# Patient Record
Sex: MALE | Race: WHITE | Employment: FULL TIME | URBAN - METROPOLITAN AREA
[De-identification: names, ages, dates, MRNs, and addresses within clinical notes are randomized per-mention and may not be internally consistent; named-entity substitution may affect disease eponyms.]

---

## 2020-08-31 ENCOUNTER — HOSPITAL ENCOUNTER (OUTPATIENT)
Age: 50
Discharge: HOME | End: 2020-09-02
Payer: COMMERCIAL

## 2020-08-31 PROCEDURE — 88305 TISSUE EXAM BY PATHOLOGIST: CPT

## 2022-03-25 ENCOUNTER — APPOINTMENT (OUTPATIENT)
Dept: CT IMAGING | Age: 52
End: 2022-03-25
Payer: COMMERCIAL

## 2022-03-25 ENCOUNTER — HOSPITAL ENCOUNTER (EMERGENCY)
Age: 52
Discharge: HOME | End: 2022-03-25
Attending: EMERGENCY MEDICINE
Payer: COMMERCIAL

## 2022-03-25 ENCOUNTER — APPOINTMENT (OUTPATIENT)
Dept: GENERAL RADIOLOGY | Age: 52
End: 2022-03-25
Payer: COMMERCIAL

## 2022-03-25 VITALS
DIASTOLIC BLOOD PRESSURE: 84 MMHG | RESPIRATION RATE: 10 BRPM | TEMPERATURE: 98.4 F | OXYGEN SATURATION: 98 % | HEART RATE: 52 BPM | SYSTOLIC BLOOD PRESSURE: 124 MMHG

## 2022-03-25 DIAGNOSIS — L03.114 CELLULITIS OF LEFT UPPER EXTREMITY: ICD-10-CM

## 2022-03-25 DIAGNOSIS — R55 NEAR SYNCOPE: Primary | ICD-10-CM

## 2022-03-25 LAB
ALBUMIN SERPL-MCNC: 4.5 G/DL (ref 3.5–5.2)
ALP BLD-CCNC: 86 U/L (ref 40–129)
ALT SERPL-CCNC: 26 U/L (ref 0–40)
ANION GAP SERPL CALCULATED.3IONS-SCNC: 13 MMOL/L (ref 7–16)
AST SERPL-CCNC: 24 U/L (ref 0–39)
BASOPHILS ABSOLUTE: 0.02 E9/L (ref 0–0.2)
BASOPHILS RELATIVE PERCENT: 0.3 % (ref 0–2)
BILIRUB SERPL-MCNC: 0.3 MG/DL (ref 0–1.2)
BUN BLDV-MCNC: 14 MG/DL (ref 6–20)
CALCIUM SERPL-MCNC: 9.2 MG/DL (ref 8.6–10.2)
CHLORIDE BLD-SCNC: 101 MMOL/L (ref 98–107)
CO2: 23 MMOL/L (ref 22–29)
CREAT SERPL-MCNC: 0.9 MG/DL (ref 0.7–1.2)
EKG ATRIAL RATE: 53 BPM
EKG P AXIS: 39 DEGREES
EKG P-R INTERVAL: 192 MS
EKG Q-T INTERVAL: 412 MS
EKG QRS DURATION: 92 MS
EKG QTC CALCULATION (BAZETT): 386 MS
EKG R AXIS: 50 DEGREES
EKG T AXIS: 36 DEGREES
EKG VENTRICULAR RATE: 53 BPM
EOSINOPHILS ABSOLUTE: 0.14 E9/L (ref 0.05–0.5)
EOSINOPHILS RELATIVE PERCENT: 2 % (ref 0–6)
GFR AFRICAN AMERICAN: >60
GFR NON-AFRICAN AMERICAN: >60 ML/MIN/1.73
GLUCOSE BLD-MCNC: 82 MG/DL (ref 74–99)
HCT VFR BLD CALC: 47.6 % (ref 37–54)
HEMOGLOBIN: 16.3 G/DL (ref 12.5–16.5)
IMMATURE GRANULOCYTES #: 0.01 E9/L
IMMATURE GRANULOCYTES %: 0.1 % (ref 0–5)
LYMPHOCYTES ABSOLUTE: 1.74 E9/L (ref 1.5–4)
LYMPHOCYTES RELATIVE PERCENT: 24.8 % (ref 20–42)
MCH RBC QN AUTO: 30 PG (ref 26–35)
MCHC RBC AUTO-ENTMCNC: 34.2 % (ref 32–34.5)
MCV RBC AUTO: 87.5 FL (ref 80–99.9)
MONOCYTES ABSOLUTE: 0.92 E9/L (ref 0.1–0.95)
MONOCYTES RELATIVE PERCENT: 13.1 % (ref 2–12)
NEUTROPHILS ABSOLUTE: 4.19 E9/L (ref 1.8–7.3)
NEUTROPHILS RELATIVE PERCENT: 59.7 % (ref 43–80)
PDW BLD-RTO: 13.1 FL (ref 11.5–15)
PLATELET # BLD: 269 E9/L (ref 130–450)
PMV BLD AUTO: 9.1 FL (ref 7–12)
POTASSIUM REFLEX MAGNESIUM: 4.5 MMOL/L (ref 3.5–5)
RBC # BLD: 5.44 E12/L (ref 3.8–5.8)
SODIUM BLD-SCNC: 137 MMOL/L (ref 132–146)
TOTAL PROTEIN: 7.7 G/DL (ref 6.4–8.3)
TROPONIN, HIGH SENSITIVITY: <6 NG/L (ref 0–11)
WBC # BLD: 7 E9/L (ref 4.5–11.5)

## 2022-03-25 PROCEDURE — 99283 EMERGENCY DEPT VISIT LOW MDM: CPT

## 2022-03-25 PROCEDURE — 93005 ELECTROCARDIOGRAM TRACING: CPT | Performed by: EMERGENCY MEDICINE

## 2022-03-25 PROCEDURE — 85025 COMPLETE CBC W/AUTO DIFF WBC: CPT

## 2022-03-25 PROCEDURE — 71045 X-RAY EXAM CHEST 1 VIEW: CPT

## 2022-03-25 PROCEDURE — 93226 XTRNL ECG REC<48 HR SCAN A/R: CPT

## 2022-03-25 PROCEDURE — 84484 ASSAY OF TROPONIN QUANT: CPT

## 2022-03-25 PROCEDURE — 80053 COMPREHEN METABOLIC PANEL: CPT

## 2022-03-25 PROCEDURE — 93225 XTRNL ECG REC<48 HRS REC: CPT

## 2022-03-25 PROCEDURE — 2580000003 HC RX 258: Performed by: EMERGENCY MEDICINE

## 2022-03-25 PROCEDURE — 70450 CT HEAD/BRAIN W/O DYE: CPT

## 2022-03-25 RX ORDER — 0.9 % SODIUM CHLORIDE 0.9 %
1000 INTRAVENOUS SOLUTION INTRAVENOUS ONCE
Status: COMPLETED | OUTPATIENT
Start: 2022-03-25 | End: 2022-03-25

## 2022-03-25 RX ORDER — CLINDAMYCIN HYDROCHLORIDE 300 MG/1
300 CAPSULE ORAL 4 TIMES DAILY
Qty: 28 CAPSULE | Refills: 0 | Status: SHIPPED | OUTPATIENT
Start: 2022-03-25 | End: 2022-04-01

## 2022-03-25 RX ADMIN — SODIUM CHLORIDE 1000 ML: 9 INJECTION, SOLUTION INTRAVENOUS at 10:32

## 2022-03-25 ASSESSMENT — ENCOUNTER SYMPTOMS
NAUSEA: 0
ABDOMINAL PAIN: 0
SHORTNESS OF BREATH: 0
DIARRHEA: 0
BACK PAIN: 0
COUGH: 0
SORE THROAT: 0
EYE PAIN: 0
VOMITING: 0

## 2022-03-25 NOTE — Clinical Note
Santos Mosqueda was seen and treated in our emergency department on 3/25/2022. He may return to work on 03/29/2022. If you have any questions or concerns, please don't hesitate to call.       Zina Nation, DO

## 2022-03-25 NOTE — ED NOTES
Cardiac Services aware pt needs Holter monitor. They will come place.       Dionna Quinn, RN  03/25/22 7244

## 2022-03-25 NOTE — ED PROVIDER NOTES
HPI   Patient is a 47 y.o. male with a past medical history of noncontributory, presenting to the Emergency Department for dizziness. History obtained by patient. Symptoms are mild to moderate in severity and intermittent since onset. They are improved by nothing and worsened by driving. Patient presents with dizziness. States that he has had intermittent dizziness and feeling like he is going to pass out for the last month. States initially started 1 month prior. The events usually only happen when he is driving to work in the morning. He states it has been persistent over the last 2 weeks. He states he was driving to work and get sudden onset of tunnel vision and feels like he is going to pass out. He states he pulls over and does deep breathing exercises it resolves after a few minutes. He then continues on with his day. He states he never really gets it on his drive home. He is never really gets it at work. He states today was a worse episode which is similar to his worst episode 1 month prior. He states he had to pull over to the side of the road. He states his feeling like he was going to pass out lasted longer. A  then pulled over and they proceeded to call EMS. He has had it happen once or twice while standing but states it always happens in the morning in route to work. Denies any stressors with work or any new life stressors. He states he does not usually feel anxious when it occurs. He does intermittently have heart palpitations with it. Denies blurry vision or changes in vision. Denies any numbness, tingling or weakness. He saw his primary care provider 2 days prior but states he forgot to bring it up to her. He has not ever fully passed out and only feels like he is going to. He states he feels somewhat flushed when it happens but does not have any diaphoresis, chest pain, nausea, vomiting.   He denies any room spinning sensation    Review of Systems Constitutional: Negative for chills and fever. HENT: Negative for congestion, ear pain and sore throat. Eyes: Negative for pain and visual disturbance. Respiratory: Negative for cough and shortness of breath. Cardiovascular: Negative for chest pain. Gastrointestinal: Negative for abdominal pain, diarrhea, nausea and vomiting. Genitourinary: Negative for dysuria and frequency. Musculoskeletal: Negative for arthralgias and back pain. Skin: Negative for rash and wound. Neurological: Positive for light-headedness. Negative for seizures, syncope, speech difficulty, weakness, numbness and headaches. All other systems reviewed and are negative. Physical Exam  Vitals and nursing note reviewed. Constitutional:       General: He is not in acute distress. Appearance: Normal appearance. He is well-developed. He is not ill-appearing. HENT:      Head: Normocephalic and atraumatic. Right Ear: Tympanic membrane normal. There is no impacted cerumen. Left Ear: Tympanic membrane normal. There is no impacted cerumen. Eyes:      Extraocular Movements: Extraocular movements intact. Cardiovascular:      Rate and Rhythm: Normal rate and regular rhythm. Pulses: Normal pulses. Heart sounds: Normal heart sounds. No murmur heard. Pulmonary:      Effort: Pulmonary effort is normal. No respiratory distress. Breath sounds: Normal breath sounds. No wheezing or rales. Abdominal:      Palpations: Abdomen is soft. Tenderness: There is no abdominal tenderness. There is no guarding or rebound. Musculoskeletal:         General: Normal range of motion. Cervical back: Normal range of motion and neck supple. Skin:     General: Skin is warm and dry. Capillary Refill: Capillary refill takes less than 2 seconds. Findings: Rash present.       Comments: Mild cellulitis skin changes to the posterior LUE, palpable pulses, compartments are soft and compressible Neurological:      General: No focal deficit present. Mental Status: He is alert and oriented to person, place, and time. Cranial Nerves: No cranial nerve deficit. Sensory: No sensory deficit. Motor: No weakness. Coordination: Coordination normal.      Gait: Gait normal.          Procedures     MDM   Patient presented to the Emergency Department for lightheadedness. They are clinically stable, vital signs stable, non toxic appearing. No chest pain, ekg reassuring. H&P less concerning forACS. Not tachy, not hypoxic, PE considered but less likely. Has never had any episodes of syncope. neurovasc in tact. Orthostatics stable. Symptoms usually at rest. Labs and imagining reassuring. Spoke with PCP and patient with recent cardio eval and work up that was reassuring. Will proceed with holter monitor and instructed for PCP and cardio follow up. Remained neurovasc in tact, with reassuring H&P, labs, imagining, okay for close outpatient eval and care. Symptoms occur when driving in am to work, anxiety component? H&P less concerning for cardiac arrythmia. Does have cellulitis and will treat. Educated patient and wife about symptoms, diagnosis and supportive care. Will follow up with PCP and cardio and wear 48hr holter which was placed in ED. Strict return precautions discussed including but not limited to symptoms with exertion, emesis, chest pain, dyspnea, leg pain or swelling, syncope. They verbalized understanding and were agreeable with the plan. All questions were answered and patient was discharged. ED Course as of 03/25/22 6271   Fri Mar 25, 2022   1117   ATTENDING PROVIDER ATTESTATION:     I have personally performed and/or participated in the history, exam, medical decision making, and procedures and agree with all pertinent clinical information unless otherwise noted. I have also reviewed and agree with the past medical, family and social history unless otherwise noted.     I have discussed this patient in detail with the resident and provided the instruction and education regarding the evidence-based evaluation and treatment of near syncope. History: patient has been experiencing near syncopal episodes most frequently in the AM on his way to work. He states he does not feel anxious. He denies CP or SOB. No pain in the legs or edema. Low risk Well's. PERC negative. My findings: Emani Gill is a 47 y.o. male whom is in no distress. Physical exam reveals well appearing. No pallor. Heart RRR, lungs CTA, abdomen is soft and nontender. No peripheral edema or tenderness. No focal neurologic deficits. My plan: Symptomatic and supportive care. Cardiac evaluation without concerning findings and no ectopy on the monitor. Will discuss with PCP placing the patient on a Holter monitor and prompt outpatient follow up. He was encouraged to avoid driving until seen by Dr Veronica Cerrato. Electronically signed by Gustavo Bone DO on 3/25/22 at 11:17 AM EDT       [JS]   6938 Spoke with Dr Veronica Cerrato. She states he had a \"full cardiac workup at [facility] recently\". It did not include a Holter monitor. She will see him in follow up. [JS]      ED Course User Index  [JS] Gustavo Bone DO          --------------------------------------------- PAST HISTORY ---------------------------------------------  Past Medical History:  has no past medical history on file. Past Surgical History:  has no past surgical history on file. Social History:  reports that he has never smoked. He does not have any smokeless tobacco history on file. He reports previous alcohol use. He reports previous drug use. Family History: family history is not on file. The patients home medications have been reviewed.     Allergies: Pcn [penicillins]    -------------------------------------------------- RESULTS -------------------------------------------------  Labs:  Results for orders placed or performed during the hospital encounter of 03/25/22   CBC with Auto Differential   Result Value Ref Range    WBC 7.0 4.5 - 11.5 E9/L    RBC 5.44 3.80 - 5.80 E12/L    Hemoglobin 16.3 12.5 - 16.5 g/dL    Hematocrit 47.6 37.0 - 54.0 %    MCV 87.5 80.0 - 99.9 fL    MCH 30.0 26.0 - 35.0 pg    MCHC 34.2 32.0 - 34.5 %    RDW 13.1 11.5 - 15.0 fL    Platelets 571 140 - 546 E9/L    MPV 9.1 7.0 - 12.0 fL    Neutrophils % 59.7 43.0 - 80.0 %    Immature Granulocytes % 0.1 0.0 - 5.0 %    Lymphocytes % 24.8 20.0 - 42.0 %    Monocytes % 13.1 (H) 2.0 - 12.0 %    Eosinophils % 2.0 0.0 - 6.0 %    Basophils % 0.3 0.0 - 2.0 %    Neutrophils Absolute 4.19 1.80 - 7.30 E9/L    Immature Granulocytes # 0.01 E9/L    Lymphocytes Absolute 1.74 1.50 - 4.00 E9/L    Monocytes Absolute 0.92 0.10 - 0.95 E9/L    Eosinophils Absolute 0.14 0.05 - 0.50 E9/L    Basophils Absolute 0.02 0.00 - 0.20 E9/L   Comprehensive Metabolic Panel w/ Reflex to MG   Result Value Ref Range    Sodium 137 132 - 146 mmol/L    Potassium reflex Magnesium 4.5 3.5 - 5.0 mmol/L    Chloride 101 98 - 107 mmol/L    CO2 23 22 - 29 mmol/L    Anion Gap 13 7 - 16 mmol/L    Glucose 82 74 - 99 mg/dL    BUN 14 6 - 20 mg/dL    CREATININE 0.9 0.7 - 1.2 mg/dL    GFR Non-African American >60 >=60 mL/min/1.73    GFR African American >60     Calcium 9.2 8.6 - 10.2 mg/dL    Total Protein 7.7 6.4 - 8.3 g/dL    Albumin 4.5 3.5 - 5.2 g/dL    Total Bilirubin 0.3 0.0 - 1.2 mg/dL    Alkaline Phosphatase 86 40 - 129 U/L    ALT 26 0 - 40 U/L    AST 24 0 - 39 U/L   Troponin   Result Value Ref Range    Troponin, High Sensitivity <6 0 - 11 ng/L   EKG 12 Lead   Result Value Ref Range    Ventricular Rate 53 BPM    Atrial Rate 53 BPM    P-R Interval 192 ms    QRS Duration 92 ms    Q-T Interval 412 ms    QTc Calculation (Bazett) 386 ms    P Axis 39 degrees    R Axis 50 degrees    T Axis 36 degrees       Radiology:  CT Head WO Contrast   Final Result   No acute intracranial abnormality.   Specifically, there is no acute   intracranial hemorrhage         XR CHEST PORTABLE   Final Result   No acute process. EKG:  This EKG is signed and interpreted by ED Physician. Time:  1023   Rate: 53  Rhythm: Sinus. Interpretation: sinus bradycardia. Normal axis. No STEMI. QTc 386  Comparison: no previous EKG.      ------------------------- NURSING NOTES AND VITALS REVIEWED ---------------------------  Date / Time Roomed:  3/25/2022  9:24 AM  ED Bed Assignment:  05/05    The nursing notes within the ED encounter and vital signs as below have been reviewed. /84   Pulse 52   Temp 98.4 °F (36.9 °C) (Oral)   Resp 10   SpO2 98%   Oxygen Saturation Interpretation: Normal      ------------------------------------------ PROGRESS NOTES ------------------------------------------  ED COURSE MEDICATIONS:                Medications   0.9 % sodium chloride bolus (0 mLs IntraVENous Stopped 3/25/22 1136)       I have spoken with the patient and discussed todays results, in addition to providing specific details for the plan of care and counseling regarding the diagnosis and prognosis. Their questions are answered at this time and they are agreeable with the plan. I discussed at length with them reasons for immediate return here for re evaluation. They will followup with primary care by calling their office tomorrow. --------------------------------- ADDITIONAL PROVIDER NOTES ---------------------------------  At this time the patient is without objective evidence of an acute process requiring hospitalization or inpatient management. They have remained hemodynamically stable throughout their entire ED visit and are stable for discharge with outpatient follow-up. The plan has been discussed in detail and they are aware of the specific conditions for emergent return, as well as the importance of follow-up. There are no discharge medications for this patient. Diagnosis:  1. Near syncope    2.  Cellulitis of left upper extremity        Disposition:  Patient's disposition: Discharge to home  Patient's condition is stable.         Americo Pate DO  Resident  03/25/22 1260

## 2022-05-25 NOTE — PROGRESS NOTES
History     1. No prior cardiac history  2. No complaints of palpitations  3. Recurrent symptoms of near syncope while sitting or standing. No specific provocative or relieving factors  4. Sinus bradycardia on hospitalization, resolved  5. No alcohol or tobacco abuse  6. Vasovagal syncope versus sinus node dysfunction. 7.The latter cannot be ruled out since some of the episodes occurred while driving his car  8. Positive Tilt test at [facility]  9. Implant of an Abbott [id number] Jot DX  [id number] on 4-4-22. Medication: Bisoprolol 2.5mg daily        ICM Interrogation:    1. Battery:  SHARON, good    2. No Episodes    3. R wave: 0.52 mV        ASSESSMENT:  Appropriate ICM function. Plan:    1. ICM Follow-up in 6 months. 2.  Merlin transmission for alerts and symptoms. 3.  Pt is aware that continuous home monitoring is strongly recommended. 4. Bisoprolol 2.5mg changed to every other day    5. Behaviour mod with increase in salt intake and reducing caffeine intake. Diya Vazquez M.D., F.A.C.C.

## 2022-05-26 ENCOUNTER — NURSE ONLY (OUTPATIENT)
Dept: NON INVASIVE DIAGNOSTICS | Age: 52
End: 2022-05-26
Payer: COMMERCIAL

## 2022-05-26 ENCOUNTER — TELEPHONE (OUTPATIENT)
Dept: NON INVASIVE DIAGNOSTICS | Age: 52
End: 2022-05-26

## 2022-05-26 VITALS — SYSTOLIC BLOOD PRESSURE: 108 MMHG | DIASTOLIC BLOOD PRESSURE: 72 MMHG

## 2022-05-26 DIAGNOSIS — R55 SYNCOPE, UNSPECIFIED SYNCOPE TYPE: ICD-10-CM

## 2022-05-26 DIAGNOSIS — Z98.890 HISTORY OF LOOP RECORDER: ICD-10-CM

## 2022-05-26 PROCEDURE — 93291 INTERROG DEV EVAL SCRMS IP: CPT | Performed by: INTERNAL MEDICINE

## 2023-03-06 ENCOUNTER — HOSPITAL ENCOUNTER (OUTPATIENT)
Dept: MRI IMAGING | Age: 53
Discharge: HOME | End: 2023-03-08
Payer: COMMERCIAL

## 2023-03-06 DIAGNOSIS — M54.50 BILATERAL LOW BACK PAIN, UNSPECIFIED CHRONICITY, UNSPECIFIED WHETHER SCIATICA PRESENT: ICD-10-CM

## 2023-03-06 PROCEDURE — 72148 MRI LUMBAR SPINE W/O DYE: CPT

## 2023-08-24 ENCOUNTER — NURSE ONLY (OUTPATIENT)
Dept: NON INVASIVE DIAGNOSTICS | Age: 53
End: 2023-08-24

## 2023-08-24 DIAGNOSIS — R55 SYNCOPE AND COLLAPSE: ICD-10-CM

## 2023-08-24 DIAGNOSIS — Z98.890 HISTORY OF LOOP RECORDER: Primary | ICD-10-CM

## 2023-08-24 RX ORDER — BISOPROLOL FUMARATE 5 MG/1
TABLET, FILM COATED ORAL
COMMUNITY
Start: 2023-08-16

## 2023-09-26 PROCEDURE — G2066 INTER DEVC REMOTE 30D: HCPCS | Performed by: INTERNAL MEDICINE

## 2023-09-26 PROCEDURE — 93298 REM INTERROG DEV EVAL SCRMS: CPT | Performed by: INTERNAL MEDICINE

## 2023-11-05 PROCEDURE — 93298 REM INTERROG DEV EVAL SCRMS: CPT | Performed by: INTERNAL MEDICINE

## 2023-11-05 PROCEDURE — G2066 INTER DEVC REMOTE 30D: HCPCS | Performed by: INTERNAL MEDICINE

## 2023-12-06 PROCEDURE — G2066 INTER DEVC REMOTE 30D: HCPCS | Performed by: INTERNAL MEDICINE

## 2023-12-06 PROCEDURE — 93298 REM INTERROG DEV EVAL SCRMS: CPT | Performed by: INTERNAL MEDICINE

## 2023-12-20 ENCOUNTER — APPOINTMENT (OUTPATIENT)
Dept: RADIOLOGY | Facility: HOSPITAL | Age: 53
DRG: 243 | End: 2023-12-20
Payer: COMMERCIAL

## 2023-12-20 ENCOUNTER — APPOINTMENT (OUTPATIENT)
Dept: CARDIOLOGY | Facility: HOSPITAL | Age: 53
DRG: 243 | End: 2023-12-20
Payer: COMMERCIAL

## 2023-12-20 ENCOUNTER — HOSPITAL ENCOUNTER (OUTPATIENT)
Facility: HOSPITAL | Age: 53
Setting detail: OBSERVATION
Discharge: HOME | DRG: 243 | End: 2023-12-21
Attending: EMERGENCY MEDICINE | Admitting: INTERNAL MEDICINE
Payer: COMMERCIAL

## 2023-12-20 DIAGNOSIS — R00.1 BRADYCARDIA: ICD-10-CM

## 2023-12-20 DIAGNOSIS — R00.1 SEVERE SINUS BRADYCARDIA: Chronic | ICD-10-CM

## 2023-12-20 DIAGNOSIS — S02.2XXB OPEN FRACTURE OF NASAL BONE, INITIAL ENCOUNTER: ICD-10-CM

## 2023-12-20 DIAGNOSIS — R55 SYNCOPE: ICD-10-CM

## 2023-12-20 DIAGNOSIS — I42.9 CARDIOMYOPATHY (MULTI): ICD-10-CM

## 2023-12-20 DIAGNOSIS — R55 SYNCOPE AND COLLAPSE: Primary | ICD-10-CM

## 2023-12-20 DIAGNOSIS — Z95.0 STATUS POST PLACEMENT OF CARDIAC PACEMAKER: ICD-10-CM

## 2023-12-20 PROBLEM — I10 ESSENTIAL HYPERTENSION: Chronic | Status: ACTIVE | Noted: 2023-12-20

## 2023-12-20 PROBLEM — Z82.49 FAMILY HISTORY OF PACEMAKER: Chronic | Status: ACTIVE | Noted: 2023-12-20

## 2023-12-20 PROBLEM — T14.90XA TRAUMA: Status: ACTIVE | Noted: 2023-12-20

## 2023-12-20 LAB
ABO GROUP (TYPE) IN BLOOD: NORMAL
ABO GROUP (TYPE) IN BLOOD: NORMAL
ALBUMIN SERPL BCP-MCNC: 4 G/DL (ref 3.4–5)
ALP SERPL-CCNC: 63 U/L (ref 33–120)
ALT SERPL W P-5'-P-CCNC: 49 U/L (ref 10–52)
ANION GAP BLDA CALCULATED.4IONS-SCNC: 10 MMO/L (ref 10–25)
ANION GAP SERPL CALC-SCNC: 12 MMOL/L (ref 10–20)
ANTIBODY SCREEN: NORMAL
AORTIC VALVE MEAN GRADIENT: 5
AORTIC VALVE PEAK VELOCITY: 1.51
AST SERPL W P-5'-P-CCNC: 57 U/L (ref 9–39)
AV PEAK GRADIENT: 9.1
AVA (PEAK VEL): 2.71
AVA (VTI): 2.55
BASE EXCESS BLDA CALC-SCNC: 3.1 MMOL/L (ref -2–3)
BASOPHILS # BLD AUTO: 0.01 X10*3/UL (ref 0–0.1)
BASOPHILS NFR BLD AUTO: 0.2 %
BILIRUB SERPL-MCNC: 0.5 MG/DL (ref 0–1.2)
BODY TEMPERATURE: 37 DEGREES CELSIUS
BUN SERPL-MCNC: 16 MG/DL (ref 6–23)
CA-I BLDA-SCNC: 1.1 MMOL/L (ref 1.1–1.33)
CALCIUM SERPL-MCNC: 8.5 MG/DL (ref 8.6–10.3)
CARDIAC TROPONIN I PNL SERPL HS: 5 NG/L (ref 0–20)
CHLORIDE BLDA-SCNC: 100 MMOL/L (ref 98–107)
CHLORIDE SERPL-SCNC: 102 MMOL/L (ref 98–107)
CO2 SERPL-SCNC: 26 MMOL/L (ref 21–32)
CREAT SERPL-MCNC: 1.16 MG/DL (ref 0.5–1.3)
EJECTION FRACTION APICAL 4 CHAMBER: 61.5
EJECTION FRACTION: 66
EOSINOPHIL # BLD AUTO: 0.04 X10*3/UL (ref 0–0.7)
EOSINOPHIL NFR BLD AUTO: 0.7 %
ERYTHROCYTE [DISTWIDTH] IN BLOOD BY AUTOMATED COUNT: 13.3 % (ref 11.5–14.5)
ETHANOL SERPL-MCNC: <10 MG/DL
GFR SERPL CREATININE-BSD FRML MDRD: 75 ML/MIN/1.73M*2
GLUCOSE BLDA-MCNC: 92 MG/DL (ref 74–99)
GLUCOSE SERPL-MCNC: 90 MG/DL (ref 74–99)
HCO3 BLDA-SCNC: 28.5 MMOL/L (ref 22–26)
HCT VFR BLD AUTO: 44.2 % (ref 41–52)
HCT VFR BLD EST: 47 % (ref 41–52)
HGB BLD-MCNC: 15.2 G/DL (ref 13.5–17.5)
HGB BLDA-MCNC: 15.6 G/DL (ref 13.5–17.5)
HOLD SPECIMEN: NORMAL
IMM GRANULOCYTES # BLD AUTO: 0.01 X10*3/UL (ref 0–0.7)
IMM GRANULOCYTES NFR BLD AUTO: 0.2 % (ref 0–0.9)
INHALED O2 CONCENTRATION: 0 %
INR PPP: 1.2 (ref 0.9–1.1)
LACTATE BLDA-SCNC: 1.9 MMOL/L (ref 0.4–2)
LACTATE SERPL-SCNC: 1.4 MMOL/L (ref 0.4–2)
LEFT ATRIUM VOLUME AREA LENGTH INDEX BSA: 20.5
LEFT VENTRICLE INTERNAL DIMENSION DIASTOLE: 4.6 (ref 3.5–6)
LEFT VENTRICULAR OUTFLOW TRACT DIAMETER: 2.1
LIPASE SERPL-CCNC: 13 U/L (ref 9–82)
LYMPHOCYTES # BLD AUTO: 1.42 X10*3/UL (ref 1.2–4.8)
LYMPHOCYTES NFR BLD AUTO: 24.5 %
MCH RBC QN AUTO: 30 PG (ref 26–34)
MCHC RBC AUTO-ENTMCNC: 34.4 G/DL (ref 32–36)
MCV RBC AUTO: 87 FL (ref 80–100)
MITRAL VALVE E/A RATIO: 1.5
MITRAL VALVE E/E' RATIO: 8.01
MONOCYTES # BLD AUTO: 1.06 X10*3/UL (ref 0.1–1)
MONOCYTES NFR BLD AUTO: 18.3 %
NEUTROPHILS # BLD AUTO: 3.25 X10*3/UL (ref 1.2–7.7)
NEUTROPHILS NFR BLD AUTO: 56.1 %
NRBC BLD-RTO: 0 /100 WBCS (ref 0–0)
OXYHGB MFR BLDA: 27.2 % (ref 94–98)
PCO2 BLDA: 45 MM HG (ref 38–42)
PH BLDA: 7.41 PH (ref 7.38–7.42)
PLATELET # BLD AUTO: 179 X10*3/UL (ref 150–450)
PO2 BLDA: 20 MM HG (ref 85–95)
POTASSIUM BLDA-SCNC: 4 MMOL/L (ref 3.5–5.3)
POTASSIUM SERPL-SCNC: 4.3 MMOL/L (ref 3.5–5.3)
PROT SERPL-MCNC: 6.5 G/DL (ref 6.4–8.2)
PROTHROMBIN TIME: 13 SECONDS (ref 9.8–12.8)
RBC # BLD AUTO: 5.07 X10*6/UL (ref 4.5–5.9)
RH FACTOR (ANTIGEN D): NORMAL
RH FACTOR (ANTIGEN D): NORMAL
RIGHT VENTRICLE FREE WALL PEAK S': 12.1
SAO2 % BLDA: 27 % (ref 94–100)
SODIUM BLDA-SCNC: 134 MMOL/L (ref 136–145)
SODIUM SERPL-SCNC: 136 MMOL/L (ref 136–145)
TRICUSPID ANNULAR PLANE SYSTOLIC EXCURSION: 2.9
WBC # BLD AUTO: 5.8 X10*3/UL (ref 4.4–11.3)

## 2023-12-20 PROCEDURE — 2780000003 HC OR 278 NO HCPCS: Performed by: INTERNAL MEDICINE

## 2023-12-20 PROCEDURE — 83690 ASSAY OF LIPASE: CPT | Performed by: EMERGENCY MEDICINE

## 2023-12-20 PROCEDURE — C1785 PMKR, DUAL, RATE-RESP: HCPCS | Performed by: INTERNAL MEDICINE

## 2023-12-20 PROCEDURE — 2500000004 HC RX 250 GENERAL PHARMACY W/ HCPCS (ALT 636 FOR OP/ED)

## 2023-12-20 PROCEDURE — 33208 INSRT HEART PM ATRIAL & VENT: CPT | Performed by: INTERNAL MEDICINE

## 2023-12-20 PROCEDURE — C1898 LEAD, PMKR, OTHER THAN TRANS: HCPCS | Performed by: INTERNAL MEDICINE

## 2023-12-20 PROCEDURE — 96374 THER/PROPH/DIAG INJ IV PUSH: CPT | Mod: 59

## 2023-12-20 PROCEDURE — 83605 ASSAY OF LACTIC ACID: CPT | Performed by: EMERGENCY MEDICINE

## 2023-12-20 PROCEDURE — 2500000004 HC RX 250 GENERAL PHARMACY W/ HCPCS (ALT 636 FOR OP/ED): Performed by: SURGERY

## 2023-12-20 PROCEDURE — 82077 ASSAY SPEC XCP UR&BREATH IA: CPT | Performed by: EMERGENCY MEDICINE

## 2023-12-20 PROCEDURE — 99153 MOD SED SAME PHYS/QHP EA: CPT | Performed by: INTERNAL MEDICINE

## 2023-12-20 PROCEDURE — 99223 1ST HOSP IP/OBS HIGH 75: CPT | Performed by: SURGERY

## 2023-12-20 PROCEDURE — 99152 MOD SED SAME PHYS/QHP 5/>YRS: CPT | Performed by: INTERNAL MEDICINE

## 2023-12-20 PROCEDURE — 76377 3D RENDER W/INTRP POSTPROCES: CPT

## 2023-12-20 PROCEDURE — 99291 CRITICAL CARE FIRST HOUR: CPT | Mod: 25 | Performed by: EMERGENCY MEDICINE

## 2023-12-20 PROCEDURE — 93306 TTE W/DOPPLER COMPLETE: CPT

## 2023-12-20 PROCEDURE — 84484 ASSAY OF TROPONIN QUANT: CPT | Performed by: EMERGENCY MEDICINE

## 2023-12-20 PROCEDURE — G0378 HOSPITAL OBSERVATION PER HR: HCPCS

## 2023-12-20 PROCEDURE — 72170 X-RAY EXAM OF PELVIS: CPT | Mod: FOREIGN READ | Performed by: RADIOLOGY

## 2023-12-20 PROCEDURE — 2060000001 HC INTERMEDIATE ICU ROOM DAILY

## 2023-12-20 PROCEDURE — 76705 ECHO EXAM OF ABDOMEN: CPT | Mod: FOREIGN READ | Performed by: RADIOLOGY

## 2023-12-20 PROCEDURE — 71045 X-RAY EXAM CHEST 1 VIEW: CPT | Mod: FOREIGN READ | Performed by: RADIOLOGY

## 2023-12-20 PROCEDURE — 36415 COLL VENOUS BLD VENIPUNCTURE: CPT | Performed by: EMERGENCY MEDICINE

## 2023-12-20 PROCEDURE — 96372 THER/PROPH/DIAG INJ SC/IM: CPT | Performed by: SURGERY

## 2023-12-20 PROCEDURE — 72170 X-RAY EXAM OF PELVIS: CPT | Mod: FR

## 2023-12-20 PROCEDURE — 2500000005 HC RX 250 GENERAL PHARMACY W/O HCPCS: Performed by: INTERNAL MEDICINE

## 2023-12-20 PROCEDURE — 2720000007 HC OR 272 NO HCPCS: Performed by: INTERNAL MEDICINE

## 2023-12-20 PROCEDURE — 84132 ASSAY OF SERUM POTASSIUM: CPT | Performed by: EMERGENCY MEDICINE

## 2023-12-20 PROCEDURE — 70450 CT HEAD/BRAIN W/O DYE: CPT

## 2023-12-20 PROCEDURE — 71045 X-RAY EXAM CHEST 1 VIEW: CPT | Mod: FR

## 2023-12-20 PROCEDURE — 2750000001 HC OR 275 NO HCPCS: Performed by: INTERNAL MEDICINE

## 2023-12-20 PROCEDURE — 2550000001 HC RX 255 CONTRASTS: Performed by: INTERNAL MEDICINE

## 2023-12-20 PROCEDURE — 96365 THER/PROPH/DIAG IV INF INIT: CPT | Mod: 59

## 2023-12-20 PROCEDURE — 96375 TX/PRO/DX INJ NEW DRUG ADDON: CPT | Mod: 59

## 2023-12-20 PROCEDURE — A4217 STERILE WATER/SALINE, 500 ML: HCPCS | Performed by: NURSE PRACTITIONER

## 2023-12-20 PROCEDURE — 96361 HYDRATE IV INFUSION ADD-ON: CPT | Mod: 59

## 2023-12-20 PROCEDURE — 2500000004 HC RX 250 GENERAL PHARMACY W/ HCPCS (ALT 636 FOR OP/ED): Performed by: NURSE PRACTITIONER

## 2023-12-20 PROCEDURE — 70450 CT HEAD/BRAIN W/O DYE: CPT | Performed by: RADIOLOGY

## 2023-12-20 PROCEDURE — 93306 TTE W/DOPPLER COMPLETE: CPT | Performed by: STUDENT IN AN ORGANIZED HEALTH CARE EDUCATION/TRAINING PROGRAM

## 2023-12-20 PROCEDURE — 99222 1ST HOSP IP/OBS MODERATE 55: CPT | Performed by: STUDENT IN AN ORGANIZED HEALTH CARE EDUCATION/TRAINING PROGRAM

## 2023-12-20 PROCEDURE — 70486 CT MAXILLOFACIAL W/O DYE: CPT

## 2023-12-20 PROCEDURE — 76705 ECHO EXAM OF ABDOMEN: CPT | Mod: FR

## 2023-12-20 PROCEDURE — 2500000004 HC RX 250 GENERAL PHARMACY W/ HCPCS (ALT 636 FOR OP/ED): Performed by: INTERNAL MEDICINE

## 2023-12-20 PROCEDURE — 85025 COMPLETE CBC W/AUTO DIFF WBC: CPT | Performed by: EMERGENCY MEDICINE

## 2023-12-20 PROCEDURE — 86901 BLOOD TYPING SEROLOGIC RH(D): CPT | Performed by: EMERGENCY MEDICINE

## 2023-12-20 PROCEDURE — C1892 INTRO/SHEATH,FIXED,PEEL-AWAY: HCPCS | Performed by: INTERNAL MEDICINE

## 2023-12-20 PROCEDURE — 99285 EMERGENCY DEPT VISIT HI MDM: CPT | Performed by: EMERGENCY MEDICINE

## 2023-12-20 PROCEDURE — 2500000001 HC RX 250 WO HCPCS SELF ADMINISTERED DRUGS (ALT 637 FOR MEDICARE OP): Performed by: SURGERY

## 2023-12-20 PROCEDURE — 85610 PROTHROMBIN TIME: CPT | Performed by: EMERGENCY MEDICINE

## 2023-12-20 PROCEDURE — C1894 INTRO/SHEATH, NON-LASER: HCPCS | Performed by: INTERNAL MEDICINE

## 2023-12-20 PROCEDURE — 99223 1ST HOSP IP/OBS HIGH 75: CPT | Performed by: STUDENT IN AN ORGANIZED HEALTH CARE EDUCATION/TRAINING PROGRAM

## 2023-12-20 PROCEDURE — G0390 TRAUMA RESPONS W/HOSP CRITI: HCPCS

## 2023-12-20 DEVICE — PACING LEAD
Type: IMPLANTABLE DEVICE | Site: CORONARY | Status: FUNCTIONAL
Brand: TENDRIL™

## 2023-12-20 DEVICE — PULSE GENERATOR
Type: IMPLANTABLE DEVICE | Site: CHEST | Status: FUNCTIONAL
Brand: ASSURITY MRI™

## 2023-12-20 RX ORDER — MIDAZOLAM HYDROCHLORIDE 1 MG/ML
INJECTION INTRAMUSCULAR; INTRAVENOUS AS NEEDED
Status: DISCONTINUED | OUTPATIENT
Start: 2023-12-20 | End: 2023-12-20 | Stop reason: HOSPADM

## 2023-12-20 RX ORDER — GUAIFENESIN 600 MG/1
600 TABLET, EXTENDED RELEASE ORAL EVERY 12 HOURS PRN
Status: DISCONTINUED | OUTPATIENT
Start: 2023-12-20 | End: 2023-12-21 | Stop reason: HOSPADM

## 2023-12-20 RX ORDER — FENTANYL CITRATE 50 UG/ML
INJECTION, SOLUTION INTRAMUSCULAR; INTRAVENOUS AS NEEDED
Status: DISCONTINUED | OUTPATIENT
Start: 2023-12-20 | End: 2023-12-20 | Stop reason: HOSPADM

## 2023-12-20 RX ORDER — IODIXANOL 320 MG/ML
INJECTION, SOLUTION INTRAVASCULAR AS NEEDED
Status: DISCONTINUED | OUTPATIENT
Start: 2023-12-20 | End: 2023-12-20 | Stop reason: HOSPADM

## 2023-12-20 RX ORDER — BACITRACIN ZINC 500 UNIT/G
OINTMENT (GRAM) TOPICAL 3 TIMES DAILY
Status: DISCONTINUED | OUTPATIENT
Start: 2023-12-20 | End: 2023-12-21 | Stop reason: HOSPADM

## 2023-12-20 RX ORDER — ACETAMINOPHEN 325 MG/1
650 TABLET ORAL EVERY 4 HOURS PRN
Status: DISCONTINUED | OUTPATIENT
Start: 2023-12-20 | End: 2023-12-20

## 2023-12-20 RX ORDER — ONDANSETRON 4 MG/1
4 TABLET, FILM COATED ORAL EVERY 8 HOURS PRN
Status: DISCONTINUED | OUTPATIENT
Start: 2023-12-20 | End: 2023-12-21 | Stop reason: HOSPADM

## 2023-12-20 RX ORDER — CHLORHEXIDINE GLUCONATE 40 MG/ML
SOLUTION TOPICAL ONCE
Status: DISCONTINUED | OUTPATIENT
Start: 2023-12-20 | End: 2023-12-21 | Stop reason: HOSPADM

## 2023-12-20 RX ORDER — PANTOPRAZOLE SODIUM 40 MG/10ML
40 INJECTION, POWDER, LYOPHILIZED, FOR SOLUTION INTRAVENOUS
Status: DISCONTINUED | OUTPATIENT
Start: 2023-12-21 | End: 2023-12-21 | Stop reason: HOSPADM

## 2023-12-20 RX ORDER — SODIUM CHLORIDE, SODIUM LACTATE, POTASSIUM CHLORIDE, CALCIUM CHLORIDE 600; 310; 30; 20 MG/100ML; MG/100ML; MG/100ML; MG/100ML
100 INJECTION, SOLUTION INTRAVENOUS CONTINUOUS
Status: DISCONTINUED | OUTPATIENT
Start: 2023-12-20 | End: 2023-12-21 | Stop reason: HOSPADM

## 2023-12-20 RX ORDER — TALC
3 POWDER (GRAM) TOPICAL DAILY
Status: DISCONTINUED | OUTPATIENT
Start: 2023-12-20 | End: 2023-12-21 | Stop reason: HOSPADM

## 2023-12-20 RX ORDER — CEFAZOLIN 1 G/1
INJECTION, POWDER, FOR SOLUTION INTRAVENOUS CODE/TRAUMA/SEDATION MEDICATION
Status: COMPLETED | OUTPATIENT
Start: 2023-12-20 | End: 2023-12-20

## 2023-12-20 RX ORDER — FERROUS SULFATE, DRIED 160(50) MG
1 TABLET, EXTENDED RELEASE ORAL DAILY
Status: ON HOLD | COMMUNITY
End: 2023-12-20 | Stop reason: ENTERED-IN-ERROR

## 2023-12-20 RX ORDER — ACETAMINOPHEN AND CODEINE PHOSPHATE 300; 30 MG/1; MG/1
1 TABLET ORAL EVERY 4 HOURS PRN
Status: DISCONTINUED | OUTPATIENT
Start: 2023-12-20 | End: 2023-12-20

## 2023-12-20 RX ORDER — LIDOCAINE HYDROCHLORIDE 20 MG/ML
INJECTION, SOLUTION INFILTRATION; PERINEURAL AS NEEDED
Status: DISCONTINUED | OUTPATIENT
Start: 2023-12-20 | End: 2023-12-20 | Stop reason: HOSPADM

## 2023-12-20 RX ORDER — ACETAMINOPHEN 325 MG/1
650 TABLET ORAL EVERY 4 HOURS PRN
Status: DISCONTINUED | OUTPATIENT
Start: 2023-12-20 | End: 2023-12-20 | Stop reason: SDUPTHER

## 2023-12-20 RX ORDER — ACETAMINOPHEN 650 MG/1
650 SUPPOSITORY RECTAL EVERY 4 HOURS PRN
Status: DISCONTINUED | OUTPATIENT
Start: 2023-12-20 | End: 2023-12-20

## 2023-12-20 RX ORDER — DOCUSATE SODIUM 100 MG/1
100 CAPSULE, LIQUID FILLED ORAL 2 TIMES DAILY
Status: DISCONTINUED | OUTPATIENT
Start: 2023-12-20 | End: 2023-12-21 | Stop reason: HOSPADM

## 2023-12-20 RX ORDER — POLYETHYLENE GLYCOL 3350 17 G/17G
17 POWDER, FOR SOLUTION ORAL DAILY
Status: DISCONTINUED | OUTPATIENT
Start: 2023-12-20 | End: 2023-12-21 | Stop reason: HOSPADM

## 2023-12-20 RX ORDER — NALOXONE HYDROCHLORIDE 0.4 MG/ML
0.2 INJECTION, SOLUTION INTRAMUSCULAR; INTRAVENOUS; SUBCUTANEOUS EVERY 5 MIN PRN
Status: DISCONTINUED | OUTPATIENT
Start: 2023-12-20 | End: 2023-12-21 | Stop reason: HOSPADM

## 2023-12-20 RX ORDER — ACETAMINOPHEN 160 MG/5ML
650 SOLUTION ORAL EVERY 4 HOURS PRN
Status: DISCONTINUED | OUTPATIENT
Start: 2023-12-20 | End: 2023-12-20

## 2023-12-20 RX ORDER — ACETAMINOPHEN 325 MG/1
650 TABLET ORAL EVERY 4 HOURS PRN
Status: DISCONTINUED | OUTPATIENT
Start: 2023-12-20 | End: 2023-12-21 | Stop reason: HOSPADM

## 2023-12-20 RX ORDER — SODIUM CHLORIDE 9 MG/ML
50 INJECTION, SOLUTION INTRAVENOUS CONTINUOUS
Status: DISCONTINUED | OUTPATIENT
Start: 2023-12-20 | End: 2023-12-20

## 2023-12-20 RX ORDER — OXYCODONE HYDROCHLORIDE 10 MG/1
10 TABLET ORAL EVERY 4 HOURS PRN
Status: DISCONTINUED | OUTPATIENT
Start: 2023-12-20 | End: 2023-12-21 | Stop reason: HOSPADM

## 2023-12-20 RX ORDER — ONDANSETRON HYDROCHLORIDE 2 MG/ML
4 INJECTION, SOLUTION INTRAVENOUS EVERY 8 HOURS PRN
Status: DISCONTINUED | OUTPATIENT
Start: 2023-12-20 | End: 2023-12-21 | Stop reason: HOSPADM

## 2023-12-20 RX ORDER — BISOPROLOL FUMARATE 5 MG/1
2.5 TABLET, FILM COATED ORAL DAILY
Status: ON HOLD | COMMUNITY
End: 2023-12-21 | Stop reason: SDUPTHER

## 2023-12-20 RX ORDER — ENOXAPARIN SODIUM 100 MG/ML
30 INJECTION SUBCUTANEOUS EVERY 12 HOURS SCHEDULED
Status: DISCONTINUED | OUTPATIENT
Start: 2023-12-20 | End: 2023-12-20

## 2023-12-20 RX ORDER — OXYCODONE HYDROCHLORIDE 5 MG/1
5 TABLET ORAL EVERY 4 HOURS PRN
Status: DISCONTINUED | OUTPATIENT
Start: 2023-12-20 | End: 2023-12-21 | Stop reason: HOSPADM

## 2023-12-20 RX ORDER — PANTOPRAZOLE SODIUM 40 MG/1
40 TABLET, DELAYED RELEASE ORAL
Status: DISCONTINUED | OUTPATIENT
Start: 2023-12-21 | End: 2023-12-21 | Stop reason: HOSPADM

## 2023-12-20 RX ORDER — ONDANSETRON HYDROCHLORIDE 2 MG/ML
INJECTION, SOLUTION INTRAVENOUS
Status: COMPLETED
Start: 2023-12-20 | End: 2023-12-20

## 2023-12-20 RX ORDER — FENTANYL CITRATE 50 UG/ML
INJECTION, SOLUTION INTRAMUSCULAR; INTRAVENOUS
Status: DISPENSED
Start: 2023-12-20 | End: 2023-12-20

## 2023-12-20 RX ORDER — BISMUTH SUBSALICYLATE 262 MG
1 TABLET,CHEWABLE ORAL DAILY
COMMUNITY

## 2023-12-20 RX ORDER — FENTANYL CITRATE-0.9 % NACL/PF 10 MCG/ML
PLASTIC BAG, INJECTION (ML) INTRAVENOUS
Status: COMPLETED | OUTPATIENT
Start: 2023-12-20 | End: 2023-12-20

## 2023-12-20 RX ORDER — FENTANYL CITRATE 50 UG/ML
INJECTION, SOLUTION INTRAMUSCULAR; INTRAVENOUS CODE/TRAUMA/SEDATION MEDICATION
Status: COMPLETED | OUTPATIENT
Start: 2023-12-20 | End: 2023-12-20

## 2023-12-20 RX ADMIN — ACETAMINOPHEN 650 MG: 325 TABLET ORAL at 20:31

## 2023-12-20 RX ADMIN — BACITRACIN ZINC: 500 OINTMENT TOPICAL at 20:32

## 2023-12-20 RX ADMIN — SODIUM CHLORIDE 1000 ML: 9 INJECTION, SOLUTION INTRAVENOUS at 09:13

## 2023-12-20 RX ADMIN — ENOXAPARIN SODIUM 30 MG: 30 INJECTION SUBCUTANEOUS at 20:32

## 2023-12-20 RX ADMIN — FENTANYL CITRATE 50 MCG: 50 INJECTION, SOLUTION INTRAMUSCULAR; INTRAVENOUS at 09:17

## 2023-12-20 RX ADMIN — CEFAZOLIN SODIUM 2 G: 1 POWDER, FOR SOLUTION INTRAMUSCULAR; INTRAVENOUS at 09:16

## 2023-12-20 RX ADMIN — ONDANSETRON: 2 INJECTION, SOLUTION INTRAMUSCULAR; INTRAVENOUS at 10:05

## 2023-12-20 RX ADMIN — VANCOMYCIN HYDROCHLORIDE: 1 INJECTION, POWDER, LYOPHILIZED, FOR SOLUTION INTRAVENOUS at 16:00

## 2023-12-20 RX ADMIN — SODIUM CHLORIDE, POTASSIUM CHLORIDE, SODIUM LACTATE AND CALCIUM CHLORIDE 100 ML/HR: 600; 310; 30; 20 INJECTION, SOLUTION INTRAVENOUS at 19:04

## 2023-12-20 RX ADMIN — SODIUM CHLORIDE 50 ML/HR: 9 INJECTION, SOLUTION INTRAVENOUS at 15:15

## 2023-12-20 RX ADMIN — VANCOMYCIN HYDROCHLORIDE 1500 MG: 1.5 INJECTION, POWDER, LYOPHILIZED, FOR SOLUTION INTRAVENOUS at 17:15

## 2023-12-20 SDOH — SOCIAL STABILITY: SOCIAL INSECURITY: WERE YOU ABLE TO COMPLETE ALL THE BEHAVIORAL HEALTH SCREENINGS?: YES

## 2023-12-20 SDOH — SOCIAL STABILITY: SOCIAL INSECURITY: ABUSE: ADULT

## 2023-12-20 SDOH — SOCIAL STABILITY: SOCIAL INSECURITY: HAVE YOU HAD THOUGHTS OF HARMING ANYONE ELSE?: YES

## 2023-12-20 ASSESSMENT — COGNITIVE AND FUNCTIONAL STATUS - GENERAL
DRESSING REGULAR UPPER BODY CLOTHING: A LITTLE
DAILY ACTIVITIY SCORE: 24
PATIENT BASELINE BEDBOUND: NO
DAILY ACTIVITIY SCORE: 21
CLIMB 3 TO 5 STEPS WITH RAILING: A LITTLE
DRESSING REGULAR LOWER BODY CLOTHING: A LITTLE
MOBILITY SCORE: 20
STANDING UP FROM CHAIR USING ARMS: A LITTLE
WALKING IN HOSPITAL ROOM: A LITTLE
MOVING TO AND FROM BED TO CHAIR: A LITTLE
HELP NEEDED FOR BATHING: A LITTLE
MOBILITY SCORE: 24

## 2023-12-20 ASSESSMENT — ENCOUNTER SYMPTOMS
ABDOMINAL PAIN: 0
COLOR CHANGE: 0
DYSURIA: 0
PHOTOPHOBIA: 0
HEADACHES: 0
POLYDIPSIA: 0
HEMATURIA: 0
TREMORS: 0
PALPITATIONS: 0
DIZZINESS: 1
SHORTNESS OF BREATH: 0
FEVER: 0
BLOOD IN STOOL: 0
COUGH: 0
VOMITING: 0
WEAKNESS: 0
SLEEP DISTURBANCE: 0
CHILLS: 0
CONFUSION: 0
LIGHT-HEADEDNESS: 1
NAUSEA: 0

## 2023-12-20 ASSESSMENT — ACTIVITIES OF DAILY LIVING (ADL)
HEARING - LEFT EAR: FUNCTIONAL
ADEQUATE_TO_COMPLETE_ADL: YES
DRESSING YOURSELF: INDEPENDENT
PATIENT'S MEMORY ADEQUATE TO SAFELY COMPLETE DAILY ACTIVITIES?: YES
BATHING: INDEPENDENT
JUDGMENT_ADEQUATE_SAFELY_COMPLETE_DAILY_ACTIVITIES: YES
GROOMING: INDEPENDENT
HEARING - RIGHT EAR: FUNCTIONAL
LACK_OF_TRANSPORTATION: PATIENT DECLINED
TOILETING: INDEPENDENT
FEEDING YOURSELF: INDEPENDENT
WALKS IN HOME: INDEPENDENT

## 2023-12-20 ASSESSMENT — LIFESTYLE VARIABLES
SKIP TO QUESTIONS 9-10: 1
AUDIT-C TOTAL SCORE: 0
REASON UNABLE TO ASSESS: NO
HOW OFTEN DO YOU HAVE A DRINK CONTAINING ALCOHOL: NEVER
PRESCIPTION_ABUSE_PAST_12_MONTHS: NO
EVER HAD A DRINK FIRST THING IN THE MORNING TO STEADY YOUR NERVES TO GET RID OF A HANGOVER: NO
EVER FELT BAD OR GUILTY ABOUT YOUR DRINKING: NO
HAVE YOU EVER FELT YOU SHOULD CUT DOWN ON YOUR DRINKING: NO
HAVE PEOPLE ANNOYED YOU BY CRITICIZING YOUR DRINKING: NO
AUDIT-C TOTAL SCORE: 0
HOW OFTEN DO YOU HAVE 6 OR MORE DRINKS ON ONE OCCASION: NEVER
SUBSTANCE_ABUSE_PAST_12_MONTHS: NO
HOW MANY STANDARD DRINKS CONTAINING ALCOHOL DO YOU HAVE ON A TYPICAL DAY: PATIENT DOES NOT DRINK

## 2023-12-20 ASSESSMENT — COLUMBIA-SUICIDE SEVERITY RATING SCALE - C-SSRS

## 2023-12-20 ASSESSMENT — PATIENT HEALTH QUESTIONNAIRE - PHQ9
1. LITTLE INTEREST OR PLEASURE IN DOING THINGS: NOT AT ALL
2. FEELING DOWN, DEPRESSED OR HOPELESS: NOT AT ALL
SUM OF ALL RESPONSES TO PHQ9 QUESTIONS 1 & 2: 0

## 2023-12-20 ASSESSMENT — PAIN SCALES - GENERAL
PAINLEVEL_OUTOF10: 5 - MODERATE PAIN
PAINLEVEL_OUTOF10: 0 - NO PAIN
PAINLEVEL_OUTOF10: 0 - NO PAIN
PAINLEVEL_OUTOF10: 3

## 2023-12-20 ASSESSMENT — PAIN - FUNCTIONAL ASSESSMENT
PAIN_FUNCTIONAL_ASSESSMENT: 0-10
PAIN_FUNCTIONAL_ASSESSMENT: UNABLE TO SELF-REPORT
PAIN_FUNCTIONAL_ASSESSMENT: 0-10

## 2023-12-20 ASSESSMENT — PAIN DESCRIPTION - LOCATION: LOCATION: FACE

## 2023-12-20 ASSESSMENT — PAIN DESCRIPTION - PROGRESSION: CLINICAL_PROGRESSION: OTHER (COMMENT)

## 2023-12-20 NOTE — Clinical Note
The PACEMAKER, GENERATOR, DUAL ASSURITY MRI - [id number] device was inserted. The leads were placed into the connector and visually verified to be in correct position. Injury current obtained.

## 2023-12-20 NOTE — H&P
TRAUMA NOTE    Level of activation: Full Activation    Time of page: 9:04AM  Time of assessment: 9:07AM  Time of documentation (if different from above): 6:38PM    Mode of Transport: EMS   Mechanism of Injury: Fall from sitting with LOC    History of Present Injury:  53M with previous history of pre-syncopal events, known history of bradycardia with a loop recorder in place, presents to the ED after being brought by the EMS for a fall from sitting.  Patient was at work, felt dizzy, fell forward onto ground with LOC unsure how long but eventually came around.  Patient currently complaining of facial pain.      PRIMARY SURVEY:  Airway: intact  Breathing: Non-laboured  Breath Sounds: Clearbilateral  Circulation:    Pulses: palpable bilateral radial, femoral, dorsalis pedis and posterior tibial arteries   Skin: warm. Capillary refill <3s   Disability: Pupils: equal, round and reactive to light   GCS:   Best Eyes: 4  Best Verbal:  5  Best Motor:  6  Total:  15  Chest XR:    IMPRESSION:  Normal heart size with no radiographic signs of active pulmonary  parenchymal infiltration.  Pelvis XR:    No acute osseous abnormality.   E-FAST:     No free fluid throughout the abdomen and chest.     SECONDARY SURVEY:  Vitals:T:36.7 HR: 44 BP: 106/63 RR: 18 SpO2: 96% on RA   Neurologic: sensation and motor function intact in bilateral upper and lower extremities   HEENT: Head: no abrasions, lacerations or contusions. Skull intact. Midface stable to palpation   Eyes: equal round and reactive to light Ears: no hemotympanum. No abrasions or lacerations   Nose: +laceration across anterior aspect measuring approximately 4cm with no pulsatile bleeding.  Dried blood in nares   Oral Cavity: Dried blood. Dentition intact, laceration in inner aspect of upper gingiva  Neck: soft, supple. Trachea midline. No abrasions, lacerations or contusions. No crepitus   Pulmonary: clear to auscultation bilaterally.   External: no abrasions, lacerations or  contusions. No crepitus  Cardiovascular:  Pulses: palpable bilateral radial, femoral, dorsalis pedis and posterior tibial arteries   Abdomen: soft, nondistended, nontender to palpation. No abrasions, lacerations or contusions   Rectal: digital rectal exam deferred. No external blood noted.   Pelvis/Perineum: pelvis stable to palpation. No pubic symphysis widening palpated. Perineum without ecchymosis   Musculoskeletal:    Back/Spine: nontender, no stepoffs. Back without abrasions, lacerations or contusions   Extremities: no abrasions, lacerations or contusions. No deformities or joint swelling    CT head W O contrast trauma protocol 12/20/2023    Narrative  Interpreted By:  Nathan Brady,  STUDY:  CT HEAD W/O CONTRAST TRAUMA PROTOCOL;  12/20/2023 9:47 am    INDICATION:  Signs/Symptoms:fall, +loc.    COMPARISON:  None.    ACCESSION NUMBER(S):  [id number]    ORDERING CLINICIAN:  ERIKA HUSTON    TECHNIQUE:  Sequential trans axial images were obtained  .    FINDINGS:  INTRACRANIAL:    CORTICAL SULCI AND EXTRA-AXIAL SPACES:  Unremarkable.    VENTRICULAR SYSTEM:  Unremarkable without significant dilatation.    CEREBRAL PARENCHYMA:  Unremarkable without significant degenerative  change.There is no evidence of definite subacute infarction,  intracranial hemorrhage or mass.    EXTRACRANIAL:  Mild mucosal thickening of the maxillary and ethmoidal air cells  indicating mild sinusitis. The calvarium is intact.  There is irregularity and slight right lateral angulation at the  nasal bone, with several foci of soft tissue emphysema suspicious for  acute fracture.    Impression  Unremarkable exam.  Additional extracranial findings as described.    Signed by: Nathan Brady 12/20/2023 9:54 AM  Dictation workstation:   [id number]      CT maxillofacial bones wo IV contrast 12/20/2023    Narrative  Interpreted By:  Nathan Brady,  STUDY:  CT FACIAL BONES WO IV CONTRAST  12/20/2023 9:47  am    INDICATION:  Signs/Symptoms:trauma    COMPARISON:  None.    ACCESSION NUMBER(S):  [id number]    ORDERING CLINICIAN:  ERIKA HUSTON    TECHNIQUE:  Thin cut axial CT images through the facial bones were obtained  with  orthogonal reconstructions .  3D reconstructions were performed on an  independent workstation and provided for review.    FINDINGS:  BONES:  Fragmentation at the tip of the nasal bones, with slight lateral  angulation of the right nasal bone and several small foci of  overlying soft tissue emphysema would be most consistent with an  acute fracture. The remaining bony structures are intact. There is  mild deviation of the nasal septum to the left.    SOFT TISSUES:  Intact. The salivary glands appear unremarkable.    LYMPH NODES:  No significant adenopathy.    INTRACRANIAL:  No acute findings as visualized.    SINUSES AND MASTOIDS:  Mucosal thickening of the maxillary air cells, ethmoidal air cells  and left frontal air cell indicating mild sinusitis.    VASCULATURE:  Unremarkable.    OTHER FINDINGS:  None significant.    Impression  Fracture of the nasal bone.  Mild sinusitis.    Signed by: Nathan Brady 12/20/2023 9:58 AM  Dictation workstation:   [id number]           Past Medical History  Past Medical History:   Diagnosis Date    Bradycardia        Surgical History  History reviewed. No pertinent surgical history.     Social History  He reports that he has never smoked. He has never used smokeless tobacco. No history on file for alcohol use and drug use.    Family History  No family history on file.     Allergies  Penicillins      Assessment/Plan   Principal Problem:    Syncope  Active Problems:    Trauma    Syncope and collapse    Open fracture of nasal bone    Admit to SD/telemetry  Cardiology consult  IV ancef and tetanus given  LMWH 30 q12  SCDs  Wash and suture lac across nose  Nasal bone # precautions: ice to nose, HOB elevated, decongestants PRN, avoid aggressive blowing, avoid contact  sports and pressure on nose  Trauma to follow and will perform Tertiary Survey in AM       I spent 45 minutes in the professional and overall care of this patient.      Netta Brandt MD

## 2023-12-20 NOTE — PROGRESS NOTES
Soren Andino is a 53 y.o. male admitted for Syncope. Pharmacy reviewed the patient's [id number] medications and allergies for accuracy.    The list below reflects the PTA list prior to pharmacy medication history. A summary a changes to the PTA medication list has been listed below. Please review each medication in order reconciliation for additional clarification and justification.    Medications added:    Medications modified:  Bisoprolol 5mg every day --> 5mg 0.5t every day     Medications to be removed:  Calcium +D  Medications of concern:      Prior to Admission Medications   Prescriptions Last Dose Informant Patient Reported? Taking?   bisoprolol (Zebeta) 5 mg tablet   Yes    Sig: Take 1 tablet (5 mg) by mouth once daily.   calcium carbonate-vitamin D3 500 mg-5 mcg (200 unit) tablet   Yes    Sig: Take 1 tablet by mouth once daily.   multivitamin tablet   Yes    Sig: Take 1 tablet by mouth once daily.      Facility-Administered Medications: None       Kinsey Keane CPhT

## 2023-12-20 NOTE — H&P
History Of Present Illness:  Soren Andino is a 53 y.o. male with PMHx s/f hypertension, presyncope s/p ILR (2022), bradycardia, presenting with a syncopal event.  Patient reports that he started feeling generally unwell during a meeting earlier today, at which point he did have a syncopal event and fell forward, subsequently hitting his nose.  His loop recorder at that point recorded a 4-second pause followed by a longer pause; he was brought to the hospital for further evaluation.  After being evaluated selectively in the ER by cardiology, electrophysiology, and trauma; patient was taken to the Cath Lab to have a pacemaker placed.  He underwent this procedure (was uneventful), had an open nasal bone fracture sutured as well, and was eventually admitted to the medicine service.  Patient was seen and examined following all of these events, feels as well as can be expected at this point.  He is not really complaining of anything, aside from some pain mostly around his nose.  He does not especially recall the events leading to his syncopal event, but does remember waking up on the ground with people in blood around him.  He denies any chest pain, palpitations, headache, recent illnesses, fever, chills, nausea, vomiting, abdominal pain, focal and/or lateralizing sensory or motor deficits.  Denies any vision changes.  Interestingly enough, he does admit that his father also required a pacemaker.    ED Course (Summary):   Vitals on presentation: T98.1, /63, HR 44 (lowest recorded heart rate being 42), RR 18, SpO2 96% RA  CBC with differential unrevealing.  WBC 5.8, Hb 15.2, platelets 179.  CMP: Glucose 90, sodium 136 potassium 4.3, BUN 16, serum creatinine 1.16.  High-sensitivity troponin 5.  Lactate 1.4  Alcohol negative  Imaging: CXR and XR pelvis negative for acute processes.  CT head without contrast negative for acute intracranial process, notable for There is irregularity and slight right lateral angulation at  the nasal bone, with several foci of soft tissue emphysema suspicious for acute fracture  Patient was given vancomycin between the ED and consulting services.    ED Course:  Diagnoses as of 12/20/23 2058   Syncope and collapse   Open fracture of nasal bone, initial encounter   Bradycardia     Relevant Results  Results for orders placed or performed during the hospital encounter of 12/20/23 (from the past 24 hour(s))   CBC and Auto Differential   Result Value Ref Range    WBC 5.8 4.4 - 11.3 x10*3/uL    nRBC 0.0 0.0 - 0.0 /100 WBCs    RBC 5.07 4.50 - 5.90 x10*6/uL    Hemoglobin 15.2 13.5 - 17.5 g/dL    Hematocrit 44.2 41.0 - 52.0 %    MCV 87 80 - 100 fL    MCH 30.0 26.0 - 34.0 pg    MCHC 34.4 32.0 - 36.0 g/dL    RDW 13.3 11.5 - 14.5 %    Platelets 179 150 - 450 x10*3/uL    Neutrophils % 56.1 40.0 - 80.0 %    Immature Granulocytes %, Automated 0.2 0.0 - 0.9 %    Lymphocytes % 24.5 13.0 - 44.0 %    Monocytes % 18.3 2.0 - 10.0 %    Eosinophils % 0.7 0.0 - 6.0 %    Basophils % 0.2 0.0 - 2.0 %    Neutrophils Absolute 3.25 1.20 - 7.70 x10*3/uL    Immature Granulocytes Absolute, Automated 0.01 0.00 - 0.70 x10*3/uL    Lymphocytes Absolute 1.42 1.20 - 4.80 x10*3/uL    Monocytes Absolute 1.06 (H) 0.10 - 1.00 x10*3/uL    Eosinophils Absolute 0.04 0.00 - 0.70 x10*3/uL    Basophils Absolute 0.01 0.00 - 0.10 x10*3/uL   Comprehensive metabolic panel   Result Value Ref Range    Glucose 90 74 - 99 mg/dL    Sodium 136 136 - 145 mmol/L    Potassium 4.3 3.5 - 5.3 mmol/L    Chloride 102 98 - 107 mmol/L    Bicarbonate 26 21 - 32 mmol/L    Anion Gap 12 10 - 20 mmol/L    Urea Nitrogen 16 6 - 23 mg/dL    Creatinine 1.16 0.50 - 1.30 mg/dL    eGFR 75 >60 mL/min/1.73m*2    Calcium 8.5 (L) 8.6 - 10.3 mg/dL    Albumin 4.0 3.4 - 5.0 g/dL    Alkaline Phosphatase 63 33 - 120 U/L    Total Protein 6.5 6.4 - 8.2 g/dL    AST 57 (H) 9 - 39 U/L    Bilirubin, Total 0.5 0.0 - 1.2 mg/dL    ALT 49 10 - 52 U/L   Type And Screen   Result Value Ref Range    ABO  TYPE O     Rh TYPE NEG     ANTIBODY SCREEN NEG    Troponin I, High Sensitivity   Result Value Ref Range    Troponin I, High Sensitivity 5 0 - 20 ng/L   Lipase   Result Value Ref Range    Lipase 13 9 - 82 U/L   Lactate   Result Value Ref Range    Lactate 1.4 0.4 - 2.0 mmol/L   Protime-INR   Result Value Ref Range    Protime 13.0 (H) 9.8 - 12.8 seconds    INR 1.2 (H) 0.9 - 1.1   Alcohol   Result Value Ref Range    Alcohol <10 <=10 mg/dL   Light Blue Top   Result Value Ref Range    Extra Tube Hold for add-ons.    Blood Gas Arterial Full Panel   Result Value Ref Range    POCT pH, Arterial 7.41 7.38 - 7.42 pH    POCT pCO2, Arterial 45 (H) 38 - 42 mm Hg    POCT pO2, Arterial 20 (LL) 85 - 95 mm Hg    POCT SO2, Arterial 27 (L) 94 - 100 %    POCT Oxy Hemoglobin, Arterial 27.2 (L) 94.0 - 98.0 %    POCT Hematocrit Calculated, Arterial 47.0 41.0 - 52.0 %    POCT Sodium, Arterial 134 (L) 136 - 145 mmol/L    POCT Potassium, Arterial 4.0 3.5 - 5.3 mmol/L    POCT Chloride, Arterial 100 98 - 107 mmol/L    POCT Ionized Calcium, Arterial 1.10 1.10 - 1.33 mmol/L    POCT Glucose, Arterial 92 74 - 99 mg/dL    POCT Lactate, Arterial 1.9 0.4 - 2.0 mmol/L    POCT Base Excess, Arterial 3.1 (H) -2.0 - 3.0 mmol/L    POCT HCO3 Calculated, Arterial 28.5 (H) 22.0 - 26.0 mmol/L    POCT Hemoglobin, Arterial 15.6 13.5 - 17.5 g/dL    POCT Anion Gap, Arterial 10 10 - 25 mmo/L    Patient Temperature 37.0 degrees Celsius    FiO2 0 %   VERIFY ABO/Rh Group Test   Result Value Ref Range    ABO TYPE O     Rh TYPE NEG    Transthoracic Echo (TTE) Complete   Result Value Ref Range    LVOT diam 2.10     LV biplane EF 66     MV E/A ratio 1.50     MV avg E/e' ratio 8.01     Tricuspid annular plane systolic excursion 2.9     AV mn grad 5.0     LA vol index A/L 20.5     AV pk agnes 1.51     RV free wall pk S' 12.10     LVIDd 4.60     Aortic Valve Area by Continuity of VTI 2.55     Aortic Valve Area by Continuity of Peak Velocity 2.71     AV pk grad 9.1     LV A4C EF  61.5       Electrophysiology procedure    Result Date: 12/20/2023  Dual chamber pacemaker implantation Procedures: Implant of dual chamber PPM (08668) Patient history: Please refer to the detailed history and physical on the patient's medical chart. Procedure narrative: The patient was in the fasting state. A grounding pad was placed. The patient was set up for continuous monitoring of surface 12 lead ECG and pulse oximetry. Blood pressure was monitored. The procedure was performed under conscious sedation. The Left upper chest was prepped and draped in the usual sterile fashion. Local anesthesia: After preoperative IV antibiotic was completely infused, subcutaneous tissues just medial to the deltopectoral area, were infiltrated with for local anesthesia. An incision was made, which was extended to the prepectoral fascia using blunt dissection. A pulse generator pocket was created. Under Fluoroscopic a Micropuncture needle was used to access x2 the Left Axillary vein using Seldinger technique. A sheath was placed over of the guidewire. The RV pacing lead was then advanced to the heart via fluoroscopic guidance. After lead placement, appropriate sensing and thresholds were obtained. The sheath was peeled away. A second sheath was advanced over another guidewire. An atrial pacing lead was advanced to the right atrium under fluoroscopic guidance. After lead placement, appropriate sensing and thresholds were obtained. The sheath was peeled away. The leads were sutured in place to the pectoralis muscle using non absorbable suture.  A dual chamber pacemaker pulse generator was attached to the leads and implanted. The device was interrogated and its parameters recorded; telemetered electrograms and pacing and sensing thresholds were measured. The pocket was flushed with Vancomycin. The wound was closed in three layers. Steri-strips were applied, and the incision covered with a sterile dressing. Manual pressure was applied.  Summary: Successful implantation of a Abbott/SJM left sided Dual chamber pacemaker Recommendations: Additional dose of antibiotics in 12 hours A PA-lateral chest X-ray should be performed and telemetry monitoring continued for 24 hours. A 12 lead ECG should be performed prior to discharge from the hospital. The patient should continue with the present medications. Patient Instructions: Please do not lift left arm above shoulder level for 4-5 weeks No repetitive motion or lifting heavy weight for 4-5 weeks No driving, alcohol or making legal decisions for 24 hours. Keep wound completely dry for 7 days; may shower but keep bandage as dry as possible. No soaking in hot tubs or baths for 10 days. May sponge bath Keep bandage on incision until seen in the office in 1 week. Allow steristrips underneath to fall off naturally.  See complete procedural log and parameters.    Transthoracic Echo (TTE) Complete    Result Date: 12/20/2023              [place] [address]      Phone [phone] Fax [phone] TRANSTHORACIC ECHOCARDIOGRAM REPORT  Patient Name:     HELENA SCHULTZ         Reading Physician:   [id number] Krishnamurthy MD Study Date:       12/20/2023          Ordering Provider:   [id number] KRISHNAMURTHY MRN/PID:          [id number]            Fellow: Accession#:       [id number]        Nurse: Date of           1970 / 53      Sonographer:         Amalia Fowler RDCS Birth/Age:        years Gender:           M                   Additional Staff: Height:           177.80 cm           Admit Date:          12/20/2023 Weight:           96.62 kg            Admission Status:    Inpatient - Routine BSA:              2.14 m2             Department Location: [facility] Blood Pressure: 117 /73 mmHg Study Type:    TRANSTHORACIC ECHO (TTE) COMPLETE Diagnosis/ICD: Syncope and collapse-R55 Indication:    Syncope CPT Codes:      Echo Complete w Full [id number] Patient History: Pertinent History: Loop Recorder. Study Detail: The following Echo studies were performed: 2D, M-Mode, Doppler and               color flow.  PHYSICIAN INTERPRETATION: Left Ventricle: Left ventricular systolic function is normal, with an estimated ejection fraction of 60-65%. There are no regional wall motion abnormalities. The left ventricular cavity size is normal. Spectral Doppler shows a normal pattern of left ventricular diastolic filling. Left Atrium: The left atrium is normal in size. Right Ventricle: The right ventricle is normal in size. There is normal right ventricular global systolic function. Right Atrium: The right atrium is normal in size. Aortic Valve: The aortic valve is probably trileaflet. There is no evidence of aortic valve regurgitation. The peak instantaneous gradient of the aortic valve is 9.1 mmHg. The mean gradient of the aortic valve is 5.0 mmHg. Mitral Valve: The mitral valve is normal in structure. There is trace mitral valve regurgitation. Tricuspid Valve: The tricuspid valve is structurally normal. There is trace tricuspid regurgitation. The right ventricular systolic pressure is unable to be estimated. Pulmonic Valve: The pulmonic valve is structurally normal. There is physiologic pulmonic valve regurgitation. Pericardium: There is no pericardial effusion noted. Aorta: The aortic root is normal.  CONCLUSIONS:  1. Left ventricular systolic function is normal with a 60-65% estimated ejection fraction. QUANTITATIVE DATA SUMMARY: 2D MEASUREMENTS:                          Normal Ranges: Ao Root d:     2.70 cm   (2.0-3.7cm) LAs:           3.70 cm   (2.7-4.0cm) IVSd:          1.13 cm   (0.6-1.1cm) LVPWd:         1.02 cm   (0.6-1.1cm) LVIDd:         4.60 cm   (3.9-5.9cm) LVIDs:         3.24 cm LV Mass Index: 81.9 g/m2 LV % FS        29.6 % LA VOLUME:                               Normal Ranges: LA Vol A4C:        39.3 ml    (22+/-6mL/m2) LA  Vol A2C:        45.5 ml LA Vol BP:         43.9 ml LA Vol Index A4C:  18.3ml/m2 LA Vol Index A2C:  21.2 ml/m2 LA Vol Index BP:   20.5 ml/m2 LA Area A4C:       15.2 cm2 LA Area A2C:       17.0 cm2 LA Major Axis A4C: 5.0 cm LA Major Axis A2C: 5.4 cm LA Volume Index:   20.5 ml/m2 RA VOLUME BY A/L METHOD:                       Normal Ranges: RA Area A4C: 15.5 cm2 AORTA MEASUREMENTS:                      Normal Ranges: Ao Sinus, d: 2.70 cm (2.1-3.5cm) Ao STJ, d:   2.50 cm (1.7-3.4cm) Asc Ao, d:   2.80 cm (2.1-3.4cm) LV SYSTOLIC FUNCTION BY 2D PLANIMETRY (MOD):                     Normal Ranges: EF-A4C View: 61.5 % (>=55%) EF-A2C View: 70.5 % EF-Biplane:  65.9 % LV DIASTOLIC FUNCTION:                           Normal Ranges: MV Peak E:    0.92 m/s    (0.7-1.2 m/s) MV Peak A:    0.61 m/s    (0.42-0.7 m/s) E/A Ratio:    1.50        (1.0-2.2) MV e'         0.12 m/s    (>8.0) MV lateral e' 0.12 m/s MV medial e'  0.11 m/s MV A Dur:     137.00 msec E/e' Ratio:   8.01        (<8.0) AORTIC VALVE:                                   Normal Ranges: AoV Vmax:                1.51 m/s (<=1.7m/s) AoV Peak P.1 mmHg (<20mmHg) AoV Mean P.0 mmHg (1.7-11.5mmHg) LVOT Max Callum:            1.18 m/s (<=1.1m/s) AoV VTI:                 31.80 cm (18-25cm) LVOT VTI:                23.40 cm LVOT Diameter:           2.10 cm  (1.8-2.4cm) AoV Area, VTI:           2.55 cm2 (2.5-5.5cm2) AoV Area,Vmax:           2.71 cm2 (2.5-4.5cm2) AoV Dimensionless Index: 0.74  RIGHT VENTRICLE: RV Basal 3.07 cm RV Mid   2.31 cm RV Major 6.4 cm TAPSE:   29.0 mm RV s'    0.12 m/s TRICUSPID VALVE/RVSP:                   Normal Ranges: IVC Diam: 2.00 cm  [id number] Al Syed MD Electronically signed on 2023 at 6:12:38 PM  ** Final **     XR chest 1 view    Result Date: 2023  STUDY: Chest Radiograph;  2023, 09:35AM INDICATION: Trauma. COMPARISON: None Available ACCESSION NUMBER(S): [id number] ORDERING CLINICIAN: ERIKA HUSTON  TECHNIQUE:  Frontal chest was obtained at 09:35 hours. FINDINGS: CARDIOMEDIASTINAL SILHOUETTE: Cardiomediastinal silhouette is normal in size and configuration. There is a loop recorder or other device projecting over the left chest.  LUNGS: Lungs are clear.  ABDOMEN: No remarkable upper abdominal findings.  BONES: No acute osseous changes.    Normal heart size with no radiographic signs of active pulmonary parenchymal infiltration. Signed by Elda Guardado DO    XR pelvis 1-2 views    Result Date: 12/20/2023  STUDY: Pelvis Radiographs; 12/20/2023, 09:35AM INDICATION: Trauma. COMPARISON: None Available. ACCESSION NUMBER(S): [id number] ORDERING CLINICIAN: ERIKA HUSTON TECHNIQUE:  One view(s) of the pelvis. FINDINGS:  The pelvic ring is intact.  There is no acute fracture.      No acute osseous abnormality. Signed by Christian Hinkle MD    US abdomen limited    Result Date: 12/20/2023  STUDY: FAST Ultrasound; 12/20/2023 9:30 AM INDICATION: Trauma. COMPARISON: None Available. ACCESSION NUMBER(S): [id number] ORDERING CLINICIAN: ERIKA HUSTON TECHNIQUE: FAST Ultrasound of the Abdomen FINDINGS: There is no free fluid visualized throughout the abdomen and chest.    No free fluid throughout the abdomen and chest. Signed by Lewis Rosario MD    CT maxillofacial bones wo IV contrast    Result Date: 12/20/2023  Interpreted By:  Nathan Brady, STUDY: CT FACIAL BONES WO IV CONTRAST  12/20/2023 9:47 am   INDICATION: Signs/Symptoms:trauma   COMPARISON: None.   ACCESSION NUMBER(S): [id number]   ORDERING CLINICIAN: ERIKA HUSTON   TECHNIQUE: Thin cut axial CT images through the facial bones were obtained  with orthogonal reconstructions .  3D reconstructions were performed on an independent workstation and provided for review.   FINDINGS: BONES: Fragmentation at the tip of the nasal bones, with slight lateral angulation of the right nasal bone and several small foci of overlying soft tissue emphysema would be most consistent with an acute  fracture. The remaining bony structures are intact. There is mild deviation of the nasal septum to the left.   SOFT TISSUES: Intact. The salivary glands appear unremarkable.   LYMPH NODES: No significant adenopathy.   INTRACRANIAL: No acute findings as visualized.   SINUSES AND MASTOIDS: Mucosal thickening of the maxillary air cells, ethmoidal air cells and left frontal air cell indicating mild sinusitis.   VASCULATURE: Unremarkable.   OTHER FINDINGS: None significant.       Fracture of the nasal bone. Mild sinusitis.   Signed by: Nathan Brady 12/20/2023 9:58 AM Dictation workstation:   [id number]    CT head W O contrast trauma protocol    Result Date: 12/20/2023  Interpreted By:  Nathan Brady, STUDY: CT HEAD W/O CONTRAST TRAUMA PROTOCOL;  12/20/2023 9:47 am   INDICATION: Signs/Symptoms:fall, +loc.   COMPARISON: None.   ACCESSION NUMBER(S): [id number]   ORDERING CLINICIAN: ERIKA HUSTON   TECHNIQUE: Sequential trans axial images were obtained  .   FINDINGS: INTRACRANIAL:   CORTICAL SULCI AND EXTRA-AXIAL SPACES:  Unremarkable.   VENTRICULAR SYSTEM:  Unremarkable without significant dilatation.   CEREBRAL PARENCHYMA:  Unremarkable without significant degenerative change.There is no evidence of definite subacute infarction, intracranial hemorrhage or mass.   EXTRACRANIAL: Mild mucosal thickening of the maxillary and ethmoidal air cells indicating mild sinusitis. The calvarium is intact. There is irregularity and slight right lateral angulation at the nasal bone, with several foci of soft tissue emphysema suspicious for acute fracture.       Unremarkable exam. Additional extracranial findings as described.   Signed by: Nathan Brady 12/20/2023 9:54 AM Dictation workstation:   [id number]     Scheduled medications:  bacitracin, , Topical, TID  chlorhexidine, , Topical, Once  diphth,pertus(acell),tetanus, 0.5 mL, intramuscular, During hospitalization  docusate sodium, 100 mg, oral, BID  melatonin, 3 mg, oral, Daily  [START  ON 12/21/2023] pantoprazole, 40 mg, oral, Daily before breakfast   Or  [START ON 12/21/2023] pantoprazole, 40 mg, intravenous, Daily before breakfast  perflutren lipid microspheres, 0.5-10 mL of dilution, intravenous, Once in imaging  perflutren protein A microsphere, 0.5 mL, intravenous, Once in imaging  perflutren protein A microsphere, 0.5 mL, intravenous, Once in imaging  polyethylene glycol, 17 g, oral, Daily  sodium chloride, 1,000 mL, intravenous, Once  sulfur hexafluoride microsphr, 2 mL, intravenous, Once in imaging      Continuous medications:  lactated Ringer's, 100 mL/hr, Last Rate: 100 mL/hr (12/20/23 1904)      PRN medications:  PRN medications: acetaminophen, guaiFENesin, naloxone, ondansetron **OR** ondansetron, oxyCODONE, oxyCODONE      Past Medical History  He has a past medical history of Bradycardia and Hypertension.    Surgical History  He has no past surgical history on file.     Social History  He reports that he has never smoked. He has never used smokeless tobacco. No history on file for alcohol use and drug use.    Family History  No family history on file.     Allergies  Penicillins    Code Status  Full Code     Review of Systems   Constitutional:  Negative for chills and fever.   HENT:          Facial pain around nose   Eyes:  Negative for photophobia and visual disturbance.   Respiratory:  Negative for cough and shortness of breath.    Cardiovascular:  Negative for chest pain, palpitations and leg swelling.   Gastrointestinal:  Negative for abdominal pain, blood in stool, nausea and vomiting.   Endocrine: Negative for polydipsia and polyuria.   Genitourinary:  Negative for decreased urine volume, dysuria, hematuria and urgency.   Skin:  Negative for color change and rash.   Neurological:  Positive for dizziness, syncope and light-headedness. Negative for tremors, weakness and headaches.        Facial pain around nose   Psychiatric/Behavioral:  Negative for confusion and sleep  disturbance.    All other systems reviewed and are negative.    Last Recorded Vitals  /67 (BP Location: Left arm, Patient Position: Sitting)   Pulse 62   Temp 36.9 °C (98.5 °F) (Temporal)   Resp 18   Wt 96.6 kg (213 lb)   SpO2 96%      Physical Exam  Vitals and nursing note reviewed.   Constitutional:       General: He is awake. He is not in acute distress.     Appearance: Normal appearance. He is well-developed. He is not ill-appearing or toxic-appearing.   HENT:      Head: Normocephalic and atraumatic.      Right Ear: External ear normal.      Left Ear: External ear normal.      Nose:      Comments: Sutured laceration across nasal bridge     Mouth/Throat:      Mouth: Mucous membranes are moist.   Eyes:      General: No scleral icterus.     Extraocular Movements: Extraocular movements intact.      Pupils: Pupils are equal, round, and reactive to light.   Cardiovascular:      Rate and Rhythm: Normal rate and regular rhythm.      Pulses: Normal pulses.      Heart sounds: No murmur heard.     No friction rub. No gallop.      Comments: New PPM; site is well-appearing   Pulmonary:      Effort: Pulmonary effort is normal. No respiratory distress.      Breath sounds: No wheezing, rhonchi or rales.   Abdominal:      General: Bowel sounds are normal. There is no distension.      Palpations: Abdomen is soft. There is no hepatomegaly, splenomegaly or mass.      Tenderness: There is no abdominal tenderness.   Musculoskeletal:         General: No deformity or signs of injury. Normal range of motion.      Cervical back: Normal range of motion and neck supple. No rigidity or tenderness.      Right lower leg: No edema.      Left lower leg: No edema.   Skin:     General: Skin is warm and dry.      Capillary Refill: Capillary refill takes less than 2 seconds.      Coloration: Skin is not pale.      Findings: No erythema, lesion or rash.   Neurological:      General: No focal deficit present.      Mental Status: He is  alert and oriented to person, place, and time.      Cranial Nerves: No cranial nerve deficit.      Sensory: No sensory deficit.   Psychiatric:         Mood and Affect: Mood normal.         Behavior: Behavior normal. Behavior is cooperative.         Thought Content: Thought content normal.         Judgment: Judgment normal.       Assessment/Plan   Principal Problem:    Syncope and collapse  Active Problems:    Trauma    Open fracture of nasal bone    Severe sinus bradycardia    Family history of pacemaker    Status post placement of cardiac pacemaker    Essential hypertension    53 y.o. male with PMHx s/f hypertension, presyncope s/p ILR (2022), bradycardia, presenting with a syncopal event.    Sinus node dysfunction with long pauses, bradycardia; now s/p PPM (12/20/23)  -Patient will need chest x-ray in the morning per protocol  -Keep potassium >4.0, mag >2.0  -Avoidance of beta-blockers/carolynn blocker agents  -Monitor on telemetry  -PPM site overall well-appearing at this time  -From this standpoint, likely able to discharge tomorrow per discussion with Dr. Rasmussen  -Per Dr. Rasmussen: NO heparin / lovenox    Syncope 2/2 above; hx presyncope s/p ILR  -As above     Open fracture of nasal bone s/p syncopal event  -Given ancef and vancomycin in the ED  -Trauma services following  -Wash/suture of lac done. Bacitracin.   -Pain management  -Precautions: ice to nose, HOB elevated, PRN decongestants, avoid aggressive blowing and contact sports, avoid a lot of pressure on nose    Essential hypertension  -Was on bisoprolol at home  -Monitor and adjust meds as needed, BP currently well-controlled      DVT Prophylaxis: SCDs, NO CHEMOPROPHYLAXIS PER D/W MACKENZIE Portillo PA-C    Dragon dictation software was used to dictate this note and thus there may be minor errors in translation/transcription including garbled speech or misspellings. Please contact for clarification if needed.

## 2023-12-20 NOTE — ED PROVIDER NOTES
HPI   Chief Complaint   Patient presents with    Trauma       Patient presents after fall LOC with a nasal laceration.  Patient was in a meeting seated at work.  Was not feeling good.  Has felt under the weather for the last few days.  Taking over-the-counter medications.  Patient had a syncopal episode and woke up bleeding on the floor.  Patient has blood thinner use.  Patient unsure of how long he was unconscious for.  Does have a history of bradycardia which she has implanted recorder for.                          [place] Coma Scale Score: 15                  Patient History   No past medical history on file.  No past surgical history on file.  No family history on file.  Social History     Tobacco Use    Smoking status: Not on file    Smokeless tobacco: Not on file   Substance Use Topics    Alcohol use: Not on file    Drug use: Not on file       Physical Exam   ED Triage Vitals [12/20/23 0905]   Temp Heart Rate Resp BP   36.7 °C (98.1 °F) (!) 44 18 106/63      SpO2 Temp src Heart Rate Source Patient Position   96 % -- -- --      BP Location FiO2 (%)     -- --       PRIMARY SURVEY  Airway: Patent  Breathing: Breath sounds equal  Circulation: 2+ radial, 2+ femoral, 2+ dp/tp  Disability:     Eye: 4     Verbal: 5     Motor: 6    SECONDARY SURVEY  Neurological: Sensation and motor grossly intact in upper and lower extremities, oriented x3  HEENT: Laceration across the bridge of the nose, no occlusions. PERRLA, no step offs, small laceration to the inside of the left upper lip.  Neck: No midline cervical tenderness, trachea midline, atraumatic  Chest: Atraumatic, nontender  Cardiovascular: Bradycardia  Abdomen: Atraumatic, nontender  Pelvic/Perineal: Atraumatic, pelvis stable, no blood present  Back/Spine: Atraumatic, nontender, no step offs  Extremities: Atraumatic, nontender, no deformity    Physical Exam    ED Course & MDM   Diagnoses as of 01/12/24 0949   Syncope and collapse   Open fracture of nasal bone, initial  encounter   Bradycardia       Medical Decision Making  Patient presents as a full trauma after fall with reported hypotension in the field. Available chart reviewed. On initial assessment the patient was found non-toxic, no acute distress, vitals hemodynamically stable and afebrile. Initial concern for fracture, subluxation, internal bleeding.  Femoral bedside fast was completed.  Patient given tetanus, fentanyl, 2 L of fluids as well as Ancef. Pt evaluated at bedside by Dr. Chadwick. Patient taken to CAT scan.  CT head is read as negative, CT max face shows nasal bone fracture and mild sinusitis.  Ultrasound shows no free fluid and no intra-abdominal injury.  Chest x-ray and pelvis x-ray are also read as negative.  Patient received Ancef for the nasal bone fracture.  CBC shows no leukocytosis, no anemia, no thrombocytopenia.  Blood gas shows slight CO2 retention with normal pH.  Metabolic panel shows no electrolyte abnormalities, normal kidney and slight AST elevation.  Troponin is normal.  Alcohol is not detected.  Family reports that his cardiologist reports there was a pause on his loop recorder.  Consulted with cardiology and electrophysiology.  Plan to take the patient's to the Cath Lab.  As this seems more medical will admit to medicine for further management.        Procedure  Critical Care    Performed by: Yury Lott MD  Authorized by: Yury Lott MD    Critical care provider statement:     Critical care time (minutes):  60    Critical care was necessary to treat or prevent imminent or life-threatening deterioration of the following conditions:  Trauma (Arrhythmia)    Critical care was time spent personally by me on the following activities:  Obtaining history from patient or surrogate, re-evaluation of patient's condition, ordering and review of radiographic studies, ordering and review of laboratory studies, examination of patient and discussions with consultants       Yury Lott MD  01/12/24  100

## 2023-12-20 NOTE — INTERVAL H&P NOTE
H&P reviewed. The patient was examined and there are no changes to the H&P.    Syncope and collapse     Plan: PPM

## 2023-12-20 NOTE — PRE-SEDATION DOCUMENTATION
Sedation Plan    ASA 2     Mallampati class: II.    Risks, benefits, and alternatives discussed with patient.    Moderate Sedation

## 2023-12-20 NOTE — Clinical Note
Sheath was exchanged in the left subclavian vein with ACCESS KIT, S-SHELDON MINI, 4FR 10CM 0.018IN 40CM, NT/PT, ECHO ENHANCE NEEDLE.

## 2023-12-20 NOTE — CONSULTS
"Inpatient consult to Cardiology  Consult performed by: Al Syed MD  Consult ordered by: Yury Lott MD  Reason for consult: Syncope, SND  Assessment/Recommendations: See full note         History Of Present Illness:    Soren Andino is a 53 y.o. male who presented to hospital due to syncope. Patient has h/o presyncope in past and was seeing EP for the same. He lad loop recorder implanted.  He broke his nose while having the events. Loop recorder had 4 s pause followed by a long pause. He was then brought to hospital. He is currently in sinus bradycardia. Given his history or presyncope and now syncope EP was contacted to offer PPM placement. EP evaluated the patient today and patient is planned to go for PPM today.     Last Recorded Vitals:  Vitals:    12/20/23 1050 12/20/23 1107 12/20/23 1120 12/20/23 1219   BP: 120/75 115/82 117/73    Pulse: 54 54 56 57   Resp: 18 10 18 18   Temp: 36.7 °C (98.1 °F)  36.7 °C (98.1 °F) 37 °C (98.6 °F)   SpO2: 100% 100% 100% 95%   Weight:    96.6 kg (213 lb)   Height:    1.803 m (5' 10.98\")       Last Labs:  CBC - 12/20/2023:  9:18 AM  5.8 15.2 179    44.2      CMP - 12/20/2023:  9:18 AM  8.5 6.5 57 --- 0.5   _ 4.0 49 63      PTT - No results in last year.  1.2   13.0 _     Troponin I, High Sensitivity   Date/Time Value Ref Range Status   12/20/2023 09:18 AM 5 0 - 20 ng/L Final      Last I/O:  No intake/output data recorded.    Past Cardiology Tests (Last 3 Years):  EKG:  No results found for this or any previous visit from the past 1095 days.    Echo:  No results found for this or any previous visit from the past 1095 days.    Ejection Fractions:  No results found for: \"EF\"  Cath:  No results found for this or any previous visit from the past 1095 days.    Stress Test:  No results found for this or any previous visit from the past 1095 days.    Cardiac Imaging:  No results found for this or any previous visit from the past 1095 days.      Past Medical History:  He has no past " medical history on file.    Past Surgical History:  He has no past surgical history on file.      Social History:  He reports that he has never smoked. He has never used smokeless tobacco. No history on file for alcohol use and drug use.    Family History:  No family history on file.     Allergies:  Penicillins    Inpatient Medications:  Scheduled medications   Medication Dose Route Frequency    chlorhexidine   Topical Once    diphth,pertus(acell),tetanus  0.5 mL intramuscular During hospitalization    perflutren protein A microsphere  0.5 mL intravenous Once in imaging    sodium chloride  1,000 mL intravenous Once    vancomycin (Vancocin) 1,000 mg in sodium chloride 0.9 % 1,000 mL irrigation   irrigation Once    vancomycin  1,500 mg intravenous Once     PRN medications   Medication     Continuous Medications   Medication Dose Last Rate    sodium chloride 0.9%  50 mL/hr       Outpatient Medications:  Current Outpatient Medications   Medication Instructions    bisoprolol (ZEBETA) 2.5 mg, oral, Daily    multivitamin tablet 1 tablet, oral, Daily       Physical Exam:  General: Alert and Oriented, No distress, cooperative  Head: Normocephalic without obvious abnormality, atraumatic  Eyes: Conjunctiva/corneas clear, EOM's grossly intact  Neck: Supple, trachea midline, No thyroid enlargement/tenderness/nodules; No JVD  Lungs: Clear to auscultation bilaterally, no wheezes, rhonci, or rales. respirations unlabored  Chest Wall: No tenderness or deformity  Heart: Regular bradycardia, normal S1/S2, no murmur  Abdomen: Soft, non-tender, Non-distended, bowel sounds active  Extremities: No edema, no cyanosis, no clubbing  Skin: bruise/hematoma in the nasal bridge,   No rashes or lesions otherwise noted  Neurologic: Alert and oriented x 3, grossly moving all extremities, speech intact'       Assessment/Plan   Syncope with fall   Sinus node dysfunction with long pauses vs extreme vasovagal episode     Plan:  Will consult EP consult  for possible PPM today   Obtain Echocardiogram   No BB/[place] blocker agents.   Maintain K ~ 4 and Mg ~ 2  Keep Atropine at bedside and attach pacer pads in case patient has another event        Code Status:  Full Code          Al Syed MD
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
## 2023-12-21 ENCOUNTER — APPOINTMENT (OUTPATIENT)
Dept: CARDIOLOGY | Facility: HOSPITAL | Age: 53
DRG: 243 | End: 2023-12-21
Payer: COMMERCIAL

## 2023-12-21 ENCOUNTER — APPOINTMENT (OUTPATIENT)
Dept: RADIOLOGY | Facility: HOSPITAL | Age: 53
DRG: 243 | End: 2023-12-21
Payer: COMMERCIAL

## 2023-12-21 VITALS
TEMPERATURE: 97.2 F | SYSTOLIC BLOOD PRESSURE: 129 MMHG | HEART RATE: 56 BPM | HEIGHT: 71 IN | OXYGEN SATURATION: 95 % | DIASTOLIC BLOOD PRESSURE: 82 MMHG | BODY MASS INDEX: 29.72 KG/M2 | WEIGHT: 212.3 LBS | RESPIRATION RATE: 18 BRPM

## 2023-12-21 PROBLEM — R55 SYNCOPE AND COLLAPSE: Status: RESOLVED | Noted: 2023-12-20 | Resolved: 2023-12-21

## 2023-12-21 PROBLEM — T14.90XA TRAUMA: Status: RESOLVED | Noted: 2023-12-20 | Resolved: 2023-12-21

## 2023-12-21 LAB
ANION GAP SERPL CALC-SCNC: 12 MMOL/L (ref 10–20)
ATRIAL RATE: 55 BPM
BUN SERPL-MCNC: 11 MG/DL (ref 6–23)
CALCIUM SERPL-MCNC: 7.4 MG/DL (ref 8.6–10.3)
CHLORIDE SERPL-SCNC: 108 MMOL/L (ref 98–107)
CO2 SERPL-SCNC: 22 MMOL/L (ref 21–32)
CREAT SERPL-MCNC: 0.81 MG/DL (ref 0.5–1.3)
ERYTHROCYTE [DISTWIDTH] IN BLOOD BY AUTOMATED COUNT: 13.2 % (ref 11.5–14.5)
GFR SERPL CREATININE-BSD FRML MDRD: >90 ML/MIN/1.73M*2
GLUCOSE SERPL-MCNC: 87 MG/DL (ref 74–99)
HCT VFR BLD AUTO: 41.2 % (ref 41–52)
HGB BLD-MCNC: 14.1 G/DL (ref 13.5–17.5)
MAGNESIUM SERPL-MCNC: 1.64 MG/DL (ref 1.6–2.4)
MCH RBC QN AUTO: 29.6 PG (ref 26–34)
MCHC RBC AUTO-ENTMCNC: 34.2 G/DL (ref 32–36)
MCV RBC AUTO: 87 FL (ref 80–100)
NRBC BLD-RTO: 0 /100 WBCS (ref 0–0)
P AXIS: 17 DEGREES
P OFFSET: 177 MS
P ONSET: 127 MS
PLATELET # BLD AUTO: 157 X10*3/UL (ref 150–450)
POTASSIUM SERPL-SCNC: 4.7 MMOL/L (ref 3.5–5.3)
PR INTERVAL: 184 MS
Q ONSET: 219 MS
QRS COUNT: 9 BEATS
QRS DURATION: 84 MS
QT INTERVAL: 424 MS
QTC CALCULATION(BAZETT): 405 MS
QTC FREDERICIA: 411 MS
R AXIS: 35 DEGREES
RBC # BLD AUTO: 4.76 X10*6/UL (ref 4.5–5.9)
SODIUM SERPL-SCNC: 137 MMOL/L (ref 136–145)
T AXIS: 5 DEGREES
T OFFSET: 431 MS
VENTRICULAR RATE: 55 BPM
WBC # BLD AUTO: 4.2 X10*3/UL (ref 4.4–11.3)

## 2023-12-21 PROCEDURE — 71046 X-RAY EXAM CHEST 2 VIEWS: CPT | Performed by: RADIOLOGY

## 2023-12-21 PROCEDURE — 71046 X-RAY EXAM CHEST 2 VIEWS: CPT

## 2023-12-21 PROCEDURE — 99232 SBSQ HOSP IP/OBS MODERATE 35: CPT | Performed by: PHYSICIAN ASSISTANT

## 2023-12-21 PROCEDURE — 36415 COLL VENOUS BLD VENIPUNCTURE: CPT | Performed by: STUDENT IN AN ORGANIZED HEALTH CARE EDUCATION/TRAINING PROGRAM

## 2023-12-21 PROCEDURE — 2500000004 HC RX 250 GENERAL PHARMACY W/ HCPCS (ALT 636 FOR OP/ED): Performed by: STUDENT IN AN ORGANIZED HEALTH CARE EDUCATION/TRAINING PROGRAM

## 2023-12-21 PROCEDURE — 93010 ELECTROCARDIOGRAM REPORT: CPT | Performed by: INTERNAL MEDICINE

## 2023-12-21 PROCEDURE — 99239 HOSP IP/OBS DSCHRG MGMT >30: CPT | Performed by: INTERNAL MEDICINE

## 2023-12-21 PROCEDURE — 83735 ASSAY OF MAGNESIUM: CPT | Performed by: STUDENT IN AN ORGANIZED HEALTH CARE EDUCATION/TRAINING PROGRAM

## 2023-12-21 PROCEDURE — G0378 HOSPITAL OBSERVATION PER HR: HCPCS

## 2023-12-21 PROCEDURE — 85027 COMPLETE CBC AUTOMATED: CPT | Performed by: STUDENT IN AN ORGANIZED HEALTH CARE EDUCATION/TRAINING PROGRAM

## 2023-12-21 PROCEDURE — 93005 ELECTROCARDIOGRAM TRACING: CPT | Mod: 59

## 2023-12-21 PROCEDURE — 80048 BASIC METABOLIC PNL TOTAL CA: CPT | Performed by: STUDENT IN AN ORGANIZED HEALTH CARE EDUCATION/TRAINING PROGRAM

## 2023-12-21 PROCEDURE — 12011 RPR F/E/E/N/L/M 2.5 CM/<: CPT

## 2023-12-21 RX ORDER — BISOPROLOL FUMARATE 5 MG/1
2.5 TABLET, FILM COATED ORAL DAILY
Start: 2023-12-21

## 2023-12-21 RX ADMIN — PANTOPRAZOLE SODIUM 40 MG: 40 TABLET, DELAYED RELEASE ORAL at 06:39

## 2023-12-21 RX ADMIN — ACETAMINOPHEN 650 MG: 325 TABLET ORAL at 04:58

## 2023-12-21 RX ADMIN — BACITRACIN ZINC: 500 OINTMENT TOPICAL at 09:28

## 2023-12-21 ASSESSMENT — ENCOUNTER SYMPTOMS
VOMITING: 0
PALPITATIONS: 0
NAUSEA: 0
ORTHOPNEA: 0
WEAKNESS: 0
WHEEZING: 0
ABDOMINAL PAIN: 0
FEVER: 0
DIARRHEA: 0
DYSURIA: 0
SHORTNESS OF BREATH: 0

## 2023-12-21 ASSESSMENT — COGNITIVE AND FUNCTIONAL STATUS - GENERAL
MOBILITY SCORE: 24
DAILY ACTIVITIY SCORE: 24

## 2023-12-21 ASSESSMENT — PAIN DESCRIPTION - LOCATION: LOCATION: FACE

## 2023-12-21 ASSESSMENT — PAIN - FUNCTIONAL ASSESSMENT
PAIN_FUNCTIONAL_ASSESSMENT: 0-10
PAIN_FUNCTIONAL_ASSESSMENT: 0-10

## 2023-12-21 ASSESSMENT — PAIN SCALES - GENERAL
PAINLEVEL_OUTOF10: 0 - NO PAIN
PAINLEVEL_OUTOF10: 0 - NO PAIN
PAINLEVEL_OUTOF10: 3

## 2023-12-21 NOTE — PROGRESS NOTES
"[facility]  TRAUMA SERVICE - PROGRESS NOTE    Patient Name: Soren Andino  MRN: [id number]  Admit Date: 1220  : 1970  AGE: 53 y.o.   GENDER: male  ==============================================================================  MECHANISM OF INJURY:   Syncopal fall  LOC (yes/no?):  Yes  Anticoagulant / Anti-platelet Rx? (for what dx?):  No  Referring Facility Name (N/A for scene EMR run): n/a    INJURIES:   Nasal bone fracture  Laceration to nasal bridge    PROCEDURES:  Cardiac pacemaker  Laceration repair to the nasal bridge    =============================================================================  CHIEF COMPLAINT / OVERNIGHT EVENTS:   Patient had cardiac pacemaker placed overnight.  Patient has no new aches or pains this morning.  States he feels well and is relieved they figured out the cause of his fall.    MEDICAL HISTORY / ROS:  Admission history and ROS reviewed. Pertinent changes as follows:      PHYSICAL EXAM:  Heart Rate:  [42-64]   Temp:  [36.2 °C (97.2 °F)-37.9 °C (100.3 °F)]   Resp:  [7-20]   BP: (104-134)/(63-82)   Height:  [180.3 cm (5' 10.98\")-180.3 cm (5' 11\")]   Weight:  [96.2 kg (212 lb)-96.6 kg (213 lb)]   SpO2:  [94 %-100 %]       Physical Exam:  NEURO: A&O x3, GCS 15, CN II-XII intact, BLEDSOE equally,   HEAD: NC/, 2 cm lack to nasal bridge with slight deformity of nasal bone  EENT: PERRL, EOMI. Pupils 4-2mm b/l.   NECK: No cervical spine tenderness or step offs, tracheal midline. No JVD.  RESPIRATORY/CHEST: Non-labored, equal chest expansion, cardiac pacemaker placed in left upper chest with dressing over top.  CV: Regular rate   ABDOMEN: soft, nontender, nondistended. No scars, abrasions or lacerations.  PELVIS: Stable to compression.  : nml external genitalia, no blood at urethral meatus  BACK/SPINE: No thoracic midline tenderness, step-offs or deformities. No lumbar midline tenderness,   EXTREMITIES: No edema or cyanosis. Nml ROM w/o pain. No " deformities, lacerations or contusions.     IMAGING SUMMARY:  (summary of new imaging findings, not a copy of dictation)  No new imaging to report    LABS:  Results from last 7 days   Lab Units 12/21/23 0458 12/20/23 0918   WBC AUTO x10*3/uL 4.2* 5.8   HEMOGLOBIN g/dL 14.1 15.2   HEMATOCRIT % 41.2 44.2   PLATELETS AUTO x10*3/uL 157 179   NEUTROS PCT AUTO %  --  56.1   LYMPHS PCT AUTO %  --  24.5   MONOS PCT AUTO %  --  18.3   EOS PCT AUTO %  --  0.7     Results from last 7 days   Lab Units 12/20/23  0918   INR  1.2*     Results from last 7 days   Lab Units 12/21/23 0458 12/20/23 0918   SODIUM mmol/L 137 136   POTASSIUM mmol/L 4.7 4.3   CHLORIDE mmol/L 108* 102   CO2 mmol/L 22 26   BUN mg/dL 11 16   CREATININE mg/dL 0.81 1.16   CALCIUM mg/dL 7.4* 8.5*   PROTEIN TOTAL g/dL  --  6.5   BILIRUBIN TOTAL mg/dL  --  0.5   ALK PHOS U/L  --  63   ALT U/L  --  49   AST U/L  --  57*   GLUCOSE mg/dL 87 90     Results from last 7 days   Lab Units 12/20/23 0918   BILIRUBIN TOTAL mg/dL 0.5     Results from last 7 days   Lab Units 12/20/23  0937   POCT PH, ARTERIAL pH 7.41   POCT PCO2, ARTERIAL mm Hg 45*   POCT PO2, ARTERIAL mm Hg 20*   POCT HCO3 CALCULATED, ARTERIAL mmol/L 28.5*   POCT BASE EXCESS, ARTERIAL mmol/L 3.1*       I have reviewed all medications, laboratory results, and imaging pertinent for today's encounter.      ==============================================================================  TODAY'S ASSESSMENT AND PLAN OF CARE:  Patient is a 53-year-old male with a history of bradycardia who presented after syncopal fall with a fracture of his nasal bone.  Patient is stable had cardiac pacemaker placed yesterday.  No additional findings on tertiary exam today.    INJURIES:   Nasal bone fracture- ENT/OMFS outpatient follow up.  Laceration to nasal bridge    OTHER MEDICAL PROBLEMS:  Bradycardia    Plan:  -No additional traumatic findings on tertiary exam  -Regular diet  -PT OT  -From trauma standpoint DVT   chemoprophylaxis if patient will remain hospitalized  -Trauma surgery will sign off at this time    Patient will be discussed with Attending Physician Dr. Rikki Wills PGY3  General Surgery Service

## 2023-12-21 NOTE — CARE PLAN
The patient's goals for the shift include      The clinical goals for the shift include stable for discharge      Problem: Pain - Adult  Goal: Verbalizes/displays adequate comfort level or baseline comfort level  Outcome: Adequate for Discharge     Problem: Safety - Adult  Goal: Free from fall injury  Outcome: Adequate for Discharge     Problem: Discharge Planning  Goal: Discharge to home or other facility with appropriate resources  Outcome: Adequate for Discharge     Problem: Chronic Conditions and Co-morbidities  Goal: Patient's chronic conditions and co-morbidity symptoms are monitored and maintained or improved  Outcome: Adequate for Discharge     Problem: Fall/Injury  Goal: Not fall by end of shift  Outcome: Adequate for Discharge  Goal: Be free from injury by end of the shift  Outcome: Adequate for Discharge  Goal: Verbalize understanding of personal risk factors for fall in the hospital  Outcome: Adequate for Discharge  Goal: Verbalize understanding of risk factor reduction measures to prevent injury from fall in the home  Outcome: Adequate for Discharge  Goal: Use assistive devices by end of the shift  Outcome: Adequate for Discharge  Goal: Pace activities to prevent fatigue by end of the shift  Outcome: Adequate for Discharge     Problem: Pain  Goal: Takes deep breaths with improved pain control throughout the shift  Outcome: Adequate for Discharge  Goal: Turns in bed with improved pain control throughout the shift  Outcome: Adequate for Discharge  Goal: Walks with improved pain control throughout the shift  Outcome: Adequate for Discharge  Goal: Performs ADL's with improved pain control throughout shift  Outcome: Adequate for Discharge  Goal: Participates in PT with improved pain control throughout the shift  Outcome: Adequate for Discharge  Goal: Free from opioid side effects throughout the shift  Outcome: Adequate for Discharge  Goal: Free from acute confusion related to pain meds throughout the  shift  Outcome: Adequate for Discharge

## 2023-12-21 NOTE — DISCHARGE INSTRUCTIONS
While your nose is healing keep your head elevated up on a few pillows while sleeping. Use as needed decongestants and avoid aggressive blowing. Avoid contact sports. Avoid applying a lot of pressure to the nose.

## 2023-12-21 NOTE — PROCEDURES
Laceration Repair     Date/Time: 12/19/2023 11:15 AM     Performed by: Jorje Wills MD  Authorized by: Netta Brandt MD    Consent:     Consent obtained:  Verbal    Consent given by:  Patient    Risks, benefits, and alternatives were discussed: yes      Risks discussed:  Infection, pain, retained foreign body, tendon damage, poor cosmetic result, nerve damage and poor wound healing    Alternatives discussed:  No treatment and delayed treatment  Universal protocol:     Procedure explained and questions answered to patient or proxy's satisfaction: yes      Relevant documents present and verified: yes      Test results available: yes      Imaging studies available: yes      Patient identity confirmed:  Verbally with patient  Anesthesia:     Anesthesia method:  Local infiltration    Local anesthetic:  Lidocaine 1% Laceration details:     Location:  Nose    Length (cm):  2    Depth (mm):  0.25  Pre-procedure details:     Preparation:  Patient was prepped and draped in usual sterile fashion  Exploration:   Treatment:     Area cleansed with:  Chlorhexidane    Amount of cleaning:  Standard    Debridement:  Minimal    Undermining:  None    Scar revision: no      Layers/structures repaired:  single continuous layer  Skin repair:     Repair method:  Sutures    Suture size:  5-0    Suture material:  Plain Gut    Suture technique:  Continuous     Number of sutures:  1  Approximation:     Approximation:  Close  Repair type:     Repair type:  Simple  Post-procedure details:     Dressing:  Open (no dressing)    Procedure completion:  Tolerated well, no immediate complications

## 2023-12-21 NOTE — PROGRESS NOTES
PROGRESS NOTE    HPI:  Soren Andino is a 53 y.o. male who presented to hospital due to syncope. Patient has h/o presyncope in past and was seeing EP for the same. He lad loop recorder implanted.  He broke his nose while having the events. Loop recorder had 4 s pause followed by a long pause. He was then brought to hospital. He is currently in sinus bradycardia. Given his history or presyncope and now syncope EP was contacted to offer PPM placement. EP evaluated the patient today and patient is planned to go for PPM today.        Subjective Data:  Cardiology and EP cardiology consult reviewed.  Patient now status post dual-chamber pacemaker implantation.  Patient voices no new cardiac complaints or concerns or recurrent episodes of feeling like he is in a pass out.  Telemetry shows sinus rhythm.  Chest x-ray just obtained.  Patient states that he will continue follow-up with his usual cardiologist and electrophysiologist in morning.    Overnight Events:    None     Objective Data:  Last Recorded Vitals:  Vitals:    12/20/23 2053 12/20/23 2327 12/21/23 0448 12/21/23 0805   BP: 104/67 121/79 134/82 129/82   BP Location: Left arm Left arm Right arm Right arm   Patient Position: Sitting Sitting Lying Sitting   Pulse: 62 62 62 56   Resp: 18 18 18 18   Temp: 36.9 °C (98.5 °F) 37.1 °C (98.7 °F) 37.2 °C (99 °F) 36.2 °C (97.2 °F)   TempSrc: Temporal Temporal Temporal Temporal   SpO2: 96% 94% 95% 95%   Weight:   96.3 kg (212 lb 4.9 oz)    Height:           Last Labs:  CBC - 12/21/2023:  4:58 AM  4.2 14.1 157    41.2      CMP - 12/21/2023:  4:58 AM  7.4 6.5 57 --- 0.5   _ 4.0 49 63      PTT - No results in last year.  1.2   13.0 _     Last I/O:  I/O last 3 completed shifts:  In: 2813.3 (29.2 mL/kg) [I.V.:813.3 (8.4 mL/kg); IV Piggyback:2000]  Out: 5 (0.1 mL/kg) [Blood:5]  Weight: 96.3 kg     Past Cardiology Tests (Last 3 Years):  Echo:  Transthoracic Echo (TTE) Complete 12/20/2023--CONCLUSIONS:   1. Left ventricular systolic  function is normal with a 60-65% estimated ejection fraction.         Inpatient Medications:  Scheduled medications   Medication Dose Route Frequency    bacitracin   Topical TID    chlorhexidine   Topical Once    diphth,pertus(acell),tetanus  0.5 mL intramuscular During hospitalization    docusate sodium  100 mg oral BID    melatonin  3 mg oral Daily    pantoprazole  40 mg oral Daily before breakfast    Or    pantoprazole  40 mg intravenous Daily before breakfast    perflutren lipid microspheres  0.5-10 mL of dilution intravenous Once in imaging    perflutren protein A microsphere  0.5 mL intravenous Once in imaging    perflutren protein A microsphere  0.5 mL intravenous Once in imaging    polyethylene glycol  17 g oral Daily    sodium chloride  1,000 mL intravenous Once    sulfur hexafluoride microsphr  2 mL intravenous Once in imaging       Review of Systems   Constitutional: Negative for fever and malaise/fatigue.   Cardiovascular:  Negative for chest pain, orthopnea and palpitations.   Respiratory:  Negative for shortness of breath and wheezing.    Skin:  Negative for itching and rash.        Mild soreness to the device pocket.   Gastrointestinal:  Negative for abdominal pain, diarrhea, nausea and vomiting.   Genitourinary:  Negative for dysuria.   Neurological:  Negative for weakness.        Physical Exam  Constitutional:       General: He is not in acute distress.  HENT:      Mouth/Throat:      Mouth: Mucous membranes are moist.   Neck:      Comments: Flat neck veins  Cardiovascular:      Rate and Rhythm: Normal rate and regular rhythm.      Heart sounds: Normal heart sounds. No murmur heard.     Comments: Device pocket flat with intact dry dressing  Pulmonary:      Effort: Pulmonary effort is normal.      Breath sounds: Normal breath sounds.   Abdominal:      General: Abdomen is flat. Bowel sounds are normal.      Palpations: Abdomen is soft.   Musculoskeletal:         General: No swelling.   Skin:      General: Skin is warm and dry.      Comments: Abrasions noted to nose   Neurological:      Mental Status: He is alert and oriented to person, place, and time.   Psychiatric:         Mood and Affect: Mood normal.        ASSESSMENT/PLAN    Syncope with fall   Sinus node dysfunction with long pauses vs extreme vasovagal episode      Plan:  Will consult EP consult for possible PPM today   Obtain Echocardiogram   No BB/Madison blocker agents.   Maintain K ~ 4 and Mg ~ 2  Keep Atropine at bedside and attach pacer pads in case patient has another event    12-21-23: Now status post dual-chamber pacemaker implantation.  Device pocket is unremarkable.  Chest x-ray interpreted by radiology shows no evidence of pneumothorax with appropriate lead placement.  Patient advised to limit left arm movements and keep ice applied to the pocket.  Again the patient will be following to his usual cardiologist and electrophysiologist in the [facility].  Patient is instructed not to drive until cleared to do so by his electrophysiologist.  His echocardiogram shows normal LV systolic function.  No further cardiac testing is planned at this time and he is appropriate for discharge from cardiac standpoint and again will follow with his usual cardiologist and electrophysiologist in morning.    Roni Jones PA-C  12/21/2023  8:51 AM

## 2023-12-21 NOTE — NURSING NOTE
Discharge instructions discussed with patient.  Discussed post pacer precautions and the importance of follow-up appointments.  All questions answered.  IV and telemetry removed.  Patients ride is on their way and will be discharged via wheelchair.

## 2023-12-21 NOTE — DISCHARGE SUMMARY
DISCHARGE SUMMARY     Discharge Diagnosis  Syncope and collapse    Issues Requiring Follow-Up  OP cardiology and EP F/U.     This discharge took greater than 35 minutes.    Test Results Pending At Discharge  Pending Labs       No current pending labs.            Hospital Course   Soren Andino is a 53 y.o. male with PMHx s/f hypertension, presyncope s/p ILR (2022), bradycardia, presenting with a syncopal event.  Patient reports that he started feeling generally unwell during a meeting earlier today, at which point he did have a syncopal event and fell forward, subsequently hitting his nose.  His loop recorder at that point recorded a 4-second pause followed by a longer pause; he was brought to the hospital for further evaluation.  After being evaluated selectively in the ER by cardiology, electrophysiology, and trauma; patient was taken to the Cath Lab to have a pacemaker placed.  He underwent this procedure (was uneventful), had an open nasal bone fracture sutured as well, and was eventually admitted to the medicine service.  Patient was seen and examined following all of these events, feels as well as can be expected at this point.  He is not really complaining of anything, aside from some pain mostly around his nose.  He does not especially recall the events leading to his syncopal event, but does remember waking up on the ground with people in blood around him.  He denies any chest pain, palpitations, headache, recent illnesses, fever, chills, nausea, vomiting, abdominal pain, focal and/or lateralizing sensory or motor deficits.  Denies any vision changes.  Interestingly enough, he does admit that his father also required a pacemaker.     ED Course (Summary):   Vitals on presentation: T98.1, /63, HR 44 (lowest recorded heart rate being 42), RR 18, SpO2 96% RA  CBC with differential unrevealing.  WBC 5.8, Hb 15.2, platelets 179.  CMP: Glucose 90, sodium 136 potassium 4.3, BUN 16, serum creatinine  1.16.  High-sensitivity troponin 5.  Lactate 1.4  Alcohol negative  Imaging: CXR and XR pelvis negative for acute processes.  CT head without contrast negative for acute intracranial process, notable for There is irregularity and slight right lateral angulation at the nasal bone, with several foci of soft tissue emphysema suspicious for acute fracture  Patient was given vancomycin between the ED and consulting services.      Sinus node dysfunction with long pauses, bradycardia; now s/p PPM (12/20/23)  -OP F/U with EP  -Holding home BB until he follows up with his OP cardiology team.     Syncope 2/2 above; hx presyncope s/p ILR  -As above      Open fracture of nasal bone s/p syncopal event  -Given ancef and vancomycin in the ED  -Trauma services following  -Wash/suture of lac done. Bacitracin.   -Precautions: ice to nose, HOB elevated, PRN decongestants, avoid aggressive blowing and contact sports, avoid a lot of pressure on nose     Essential hypertension  -Was on bisoprolol at home, will advise to hold until seeing his OP cardiologist/electrophysiologist.       Pertinent Physical Exam At Time of Discharge  Physical Exam  Vitals and nursing note reviewed.   Constitutional:       General: He is awake. He is not in acute distress.     Appearance: Normal appearance. He is well-developed. He is not ill-appearing or toxic-appearing.   HENT:      Head: Normocephalic and atraumatic.      Right Ear: External ear normal.      Left Ear: External ear normal.      Nose:      Comments: Sutured laceration across nasal bridge     Mouth/Throat:      Mouth: Mucous membranes are moist.   Eyes:      General: No scleral icterus.     Extraocular Movements: Extraocular movements intact.      Pupils: Pupils are equal, round, and reactive to light.   Cardiovascular:      Rate and Rhythm: Normal rate and regular rhythm.      Pulses: Normal pulses.      Heart sounds: No murmur heard.     No friction rub. No gallop.      Comments: New PPM;  site is well-appearing   Pulmonary:      Effort: Pulmonary effort is normal. No respiratory distress.      Breath sounds: No wheezing, rhonchi or rales.   Abdominal:      General: Bowel sounds are normal. There is no distension.      Palpations: Abdomen is soft. There is no hepatomegaly, splenomegaly or mass.      Tenderness: There is no abdominal tenderness.   Musculoskeletal:         General: No deformity or signs of injury. Normal range of motion.      Cervical back: Normal range of motion and neck supple. No rigidity or tenderness.      Right lower leg: No edema.      Left lower leg: No edema.   Skin:     General: Skin is warm and dry.      Capillary Refill: Capillary refill takes less than 2 seconds.      Coloration: Skin is not pale.      Findings: No erythema, lesion or rash.   Neurological:      General: No focal deficit present.      Mental Status: He is alert and oriented to person, place, and time.      Cranial Nerves: No cranial nerve deficit.      Sensory: No sensory deficit.   Psychiatric:         Mood and Affect: Mood normal.         Behavior: Behavior normal. Behavior is cooperative.         Thought Content: Thought content normal.         Judgment: Judgment normal.     Home Medications     Medication List      CHANGE how you take these medications     bisoprolol 5 mg tablet; Commonly known as: Zebeta; Take 0.5 tablets (2.5   mg) by mouth once daily. Please DO NOT restart until discussing with your   cardiologist/electrophysiologist; What changed: additional instructions     CONTINUE taking these medications     multivitamin tablet       Outpatient Follow-Up  No follow-ups on file.     Darrell Zimmerman MD PhD  12/21/2023  9:23 AM

## 2024-01-04 ENCOUNTER — NURSE ONLY (OUTPATIENT)
Dept: NON INVASIVE DIAGNOSTICS | Age: 54
End: 2024-01-04

## 2024-01-04 VITALS — SYSTOLIC BLOOD PRESSURE: 130 MMHG | DIASTOLIC BLOOD PRESSURE: 80 MMHG

## 2024-01-04 DIAGNOSIS — I45.89 CHRONOTROPIC INCOMPETENCE: ICD-10-CM

## 2024-01-04 DIAGNOSIS — Z95.0 PACEMAKER: ICD-10-CM

## 2024-01-04 DIAGNOSIS — R55 SYNCOPE AND COLLAPSE: Primary | ICD-10-CM

## 2024-01-30 ENCOUNTER — NURSE ONLY (OUTPATIENT)
Dept: NON INVASIVE DIAGNOSTICS | Age: 54
End: 2024-01-30
Payer: COMMERCIAL

## 2024-01-30 VITALS — SYSTOLIC BLOOD PRESSURE: 112 MMHG | DIASTOLIC BLOOD PRESSURE: 78 MMHG

## 2024-01-30 DIAGNOSIS — R55 SYNCOPE AND COLLAPSE: ICD-10-CM

## 2024-01-30 DIAGNOSIS — Z95.0 PACEMAKER: Primary | ICD-10-CM

## 2024-01-30 PROCEDURE — 93280 PM DEVICE PROGR EVAL DUAL: CPT | Performed by: INTERNAL MEDICINE

## 2024-01-30 RX ORDER — ATORVASTATIN CALCIUM 20 MG/1
20 TABLET, FILM COATED ORAL DAILY
COMMUNITY
Start: 2023-12-28

## 2024-02-01 ENCOUNTER — APPOINTMENT (OUTPATIENT)
Dept: CARDIOLOGY | Facility: HOSPITAL | Age: 54
End: 2024-02-01
Payer: COMMERCIAL

## 2024-07-26 ENCOUNTER — TELEPHONE (OUTPATIENT)
Dept: NON INVASIVE DIAGNOSTICS | Age: 54
End: 2024-07-26

## 2024-07-26 NOTE — TELEPHONE ENCOUNTER
Merlin alert for low battery on ILR  JOT DX ICM [id number] implanted 04/04/2022          Patient has a dual chamber pacemaker ([employer]) that was placed at [facility] on 12/20/2023, Now that the battery on his ILR is low he is requesting his loop recorder be removed. Reviewed with amy Gagnon to schedule loop recorder explant. Informed Raúl that Ashleigh would be calling him to schedule the explant.

## 2024-07-30 ENCOUNTER — TELEPHONE (OUTPATIENT)
Dept: NON INVASIVE DIAGNOSTICS | Age: 54
End: 2024-07-30

## 2024-07-30 ENCOUNTER — PREP FOR PROCEDURE (OUTPATIENT)
Dept: NON INVASIVE DIAGNOSTICS | Age: 54
End: 2024-07-30

## 2024-07-30 RX ORDER — SODIUM CHLORIDE 9 MG/ML
INJECTION, SOLUTION INTRAVENOUS PRN
Status: CANCELLED | OUTPATIENT
Start: 2024-08-14

## 2024-07-30 RX ORDER — SODIUM CHLORIDE 0.9 % (FLUSH) 0.9 %
5-40 SYRINGE (ML) INJECTION EVERY 12 HOURS SCHEDULED
Status: CANCELLED | OUTPATIENT
Start: 2023-08-14

## 2024-07-30 RX ORDER — SODIUM CHLORIDE 0.9 % (FLUSH) 0.9 %
5-40 SYRINGE (ML) INJECTION PRN
Status: CANCELLED | OUTPATIENT
Start: 2024-08-14

## 2024-07-30 NOTE — TELEPHONE ENCOUNTER
Patient is scheduled for a Loop Explant on 8/14/2024 with Dr. Cloud.    Pre-op instructions given to patient over the phone and also emailed to [email or web address]. Patient doesn't have to hold any medication for this procedure. Patient voiced understanding.    2 week wound check scheduled for 8/30/2024 at 11:00 am in the [facility] office.

## 2024-08-13 ENCOUNTER — TELEPHONE (OUTPATIENT)
Age: 54
End: 2024-08-13

## 2024-08-14 ENCOUNTER — HOSPITAL ENCOUNTER (OUTPATIENT)
Age: 54
Setting detail: OUTPATIENT SURGERY
Discharge: HOME | End: 2024-08-14
Attending: INTERNAL MEDICINE | Admitting: INTERNAL MEDICINE
Payer: COMMERCIAL

## 2024-08-14 VITALS
WEIGHT: 198 LBS | DIASTOLIC BLOOD PRESSURE: 97 MMHG | OXYGEN SATURATION: 95 % | HEART RATE: 50 BPM | BODY MASS INDEX: 27.72 KG/M2 | SYSTOLIC BLOOD PRESSURE: 148 MMHG | RESPIRATION RATE: 16 BRPM | HEIGHT: 71 IN | TEMPERATURE: 97.6 F

## 2024-08-14 DIAGNOSIS — R55 SYNCOPE AND COLLAPSE: ICD-10-CM

## 2024-08-14 PROBLEM — Z45.09 ENCOUNTER FOR LOOP RECORDER AT END OF BATTERY LIFE: Status: ACTIVE | Noted: 2024-08-14

## 2024-08-14 LAB — ECHO BSA: 2.12 M2

## 2024-08-14 PROCEDURE — 2709999900 HC NON-CHARGEABLE SUPPLY: Performed by: INTERNAL MEDICINE

## 2024-08-14 PROCEDURE — 33286 RMVL SUBQ CAR RHYTHM MNTR: CPT | Performed by: INTERNAL MEDICINE

## 2024-08-14 PROCEDURE — 2500000003 HC RX 250 WO HCPCS: Performed by: INTERNAL MEDICINE

## 2024-08-14 PROCEDURE — 7100000010 HC PHASE II RECOVERY - FIRST 15 MIN: Performed by: INTERNAL MEDICINE

## 2024-08-14 PROCEDURE — 7100000011 HC PHASE II RECOVERY - ADDTL 15 MIN: Performed by: INTERNAL MEDICINE

## 2024-08-14 RX ORDER — DOXYCYCLINE HYCLATE 100 MG
100 TABLET ORAL 2 TIMES DAILY
Qty: 10 TABLET | Refills: 0 | Status: SHIPPED | OUTPATIENT
Start: 2024-08-14 | End: 2024-08-19

## 2024-08-14 RX ORDER — SODIUM CHLORIDE 9 MG/ML
INJECTION, SOLUTION INTRAVENOUS PRN
Status: DISCONTINUED | OUTPATIENT
Start: 2024-08-14 | End: 2024-08-14 | Stop reason: HOSPADM

## 2024-08-14 RX ORDER — SODIUM CHLORIDE 0.9 % (FLUSH) 0.9 %
5-40 SYRINGE (ML) INJECTION PRN
Status: DISCONTINUED | OUTPATIENT
Start: 2024-08-14 | End: 2024-08-14 | Stop reason: HOSPADM

## 2024-08-14 RX ORDER — SODIUM CHLORIDE 0.9 % (FLUSH) 0.9 %
5-40 SYRINGE (ML) INJECTION EVERY 12 HOURS SCHEDULED
Status: DISCONTINUED | OUTPATIENT
Start: 2024-08-14 | End: 2024-08-14 | Stop reason: HOSPADM

## 2024-08-14 NOTE — PROGRESS NOTES
Patient to CVL recovery area post Loop removal. Patient A&O x3. VSS. Mid chest site soft, without oozing or hematoma. Dressing clean and dry.

## 2024-08-14 NOTE — DISCHARGE INSTRUCTIONS
[facility]  Implantable Cardiac Monitor Removal Discharge Instructions For Patients    Follow-Up: You will be seen on Friday 8/30/24 at 11:00 am at the [facility] for an incision check.  Please call the office if you need to schedule/reschedule this appointment. The number is [phone] or [phone].     Incision Care:   Leave (brown) aquacel dressing on for 7 days.  Remove aquacel dressing on Wednesday 8/21/24, but do not remove the Steri-Strips from your incision. They will fall off on their own, or they will be removed at your follow-up appointment.    You may shower starting tomorrow, but do not let the water run directly on the incision  for 7 days.   Check the area daily. If you find any redness, swelling, drainage, warmth, or have a fever greater than 100 degrees, notify the office immediately at the number listed below.     Activity: You may continue regular daily activities, but try to prevent any hard blows to the incision site.    Driving: No driving until tomorrow.    [facility]  [address] [place] [address]  [address]  [facility] [address] [phone]     If no answer at the above number please call [phone] for assistance

## 2024-08-14 NOTE — H&P
[facility]- [facility] [place]  H and P  PATIENT: Raúl Kenny  MEDICAL RECORD NUMBER: [id number]  DATE OF SERVICE:  8/14/2024  ATTENDING ELECTROPHYSIOLOGIST: Kim Cloud MD  PRIMARY ELECTROPHYSIOLOGIST: Kim Cloud MD  REFERRING PHYSICIAN: Kim Cloud MD and Herminio Oakes DO  CHIEF COMPLAINT: Patient here for removal of his loop recorder    HPI: This is a 54 y.o. male with a history of hypertension near syncope and syncope that started 2 years ago.  He had episodes of severe near syncope while driving initially and had to pull himself over.  He was seen by me at [facility] at that time and we discussed tilt table testing which was completed on 4/4/2022.  Tilt test was positive.  Loop recorder was also placed and the patient was discharged home on low-dose beta-blockade.  In December 2023 he had a sudden syncopal episode when his implantable loop monitor recorded a 4-second pause followed by even longer pauses and he was brought to local hospital in [place].  He underwent a pacemaker implant which has been followed in my office.  The patient wished for his loop recorder to be removed and he is brought in for the same today.  No recent cardiac symptoms ,no recurrence of syncope      Patient Active Problem List    Diagnosis Date Noted    Shoulder pain 05/12/2011    Shoulder bursitis 05/12/2011    Shoulder impingement 05/12/2011    Biceps tendonitis 05/12/2011       Past Medical History:   Diagnosis Date    History of Holter monitoring 03/25/2022       History reviewed. No pertinent family history.    Social History     Tobacco Use    Smoking status: Never    Smokeless tobacco: Not on file   Substance Use Topics    Alcohol use: Not Currently       Current Facility-Administered Medications   Medication Dose Route Frequency Provider Last Rate Last Admin    sodium chloride flush 0.9 % injection 5-40 mL  5-40 mL IntraVENous 2 times per day Ashleigh Haines        
no

## 2024-08-30 ENCOUNTER — TELEPHONE (OUTPATIENT)
Dept: NON INVASIVE DIAGNOSTICS | Age: 54
End: 2024-08-30

## 2024-08-30 NOTE — TELEPHONE ENCOUNTER
Raúl called in today to state he could not come in for his post ILR wound explant check d/t work.. States incision is healed. Looks great, without any redness, drainage, or swelling. Denies any complaints. Instructed to call with any questions or concerns.

## 2025-01-29 ENCOUNTER — TRANSCRIBE ORDERS (OUTPATIENT)
Dept: ADMINISTRATIVE | Age: 55
End: 2025-01-29

## 2025-01-29 DIAGNOSIS — R22.41 MASS OF FOOT, RIGHT: Primary | ICD-10-CM

## (undated) DEVICE — INTRODUCER SYSTEM, PRELUDE SNAP, SPLITTABLE, HEMOSTATIC, 7FR

## (undated) DEVICE — PAD, DEFIB, ADULT, RADIOTRAN, PHYSIO, LO: Brand: MEDLINE

## (undated) DEVICE — ACCESS KIT, S-MAK MINI, 4FR 10CM 0.018IN 40CM, NT/PT, ECHO ENHANCE NEEDLE